# Patient Record
Sex: FEMALE | Race: WHITE | NOT HISPANIC OR LATINO | Employment: UNEMPLOYED | URBAN - METROPOLITAN AREA
[De-identification: names, ages, dates, MRNs, and addresses within clinical notes are randomized per-mention and may not be internally consistent; named-entity substitution may affect disease eponyms.]

---

## 2018-09-24 ENCOUNTER — HOSPITAL ENCOUNTER (OUTPATIENT)
Dept: LAB | Facility: MEDICAL CENTER | Age: 33
End: 2018-09-24
Attending: OBSTETRICS & GYNECOLOGY
Payer: COMMERCIAL

## 2018-09-24 PROCEDURE — 88175 CYTOPATH C/V AUTO FLUID REDO: CPT

## 2018-09-25 LAB — CYTOLOGY REG CYTOL: NORMAL

## 2018-10-11 LAB
ABO GROUP BLD: NORMAL
HBV SURFACE AG SERPL QL IA: NORMAL
RH BLD: NORMAL
RUBV IGG SERPL IA-ACNC: NORMAL

## 2018-12-17 ENCOUNTER — HOSPITAL ENCOUNTER (OUTPATIENT)
Dept: LAB | Facility: MEDICAL CENTER | Age: 33
End: 2018-12-17
Attending: OBSTETRICS & GYNECOLOGY
Payer: COMMERCIAL

## 2018-12-17 LAB
GLUCOSE 1H P 50 G GLC PO SERPL-MCNC: 108 MG/DL (ref 70–139)
HCT VFR BLD AUTO: 34.6 % (ref 37–47)
HGB BLD-MCNC: 11.2 G/DL (ref 12–16)
PLATELET # BLD AUTO: 216 K/UL (ref 164–446)
TREPONEMA PALLIDUM IGG+IGM AB [PRESENCE] IN SERUM OR PLASMA BY IMMUNOASSAY: NON REACTIVE

## 2018-12-17 PROCEDURE — 36415 COLL VENOUS BLD VENIPUNCTURE: CPT

## 2018-12-17 PROCEDURE — 85049 AUTOMATED PLATELET COUNT: CPT

## 2018-12-17 PROCEDURE — 86780 TREPONEMA PALLIDUM: CPT

## 2018-12-17 PROCEDURE — 82950 GLUCOSE TEST: CPT

## 2018-12-17 PROCEDURE — 85014 HEMATOCRIT: CPT

## 2018-12-17 PROCEDURE — 85018 HEMOGLOBIN: CPT

## 2019-01-09 ENCOUNTER — HOSPITAL ENCOUNTER (OUTPATIENT)
Dept: LAB | Facility: MEDICAL CENTER | Age: 34
End: 2019-01-09
Attending: OBSTETRICS & GYNECOLOGY
Payer: COMMERCIAL

## 2019-01-09 LAB — BLD GP AB SCN SERPL QL: NORMAL

## 2019-01-09 PROCEDURE — 36415 COLL VENOUS BLD VENIPUNCTURE: CPT

## 2019-01-09 PROCEDURE — 86850 RBC ANTIBODY SCREEN: CPT

## 2019-01-15 ENCOUNTER — APPOINTMENT (OUTPATIENT)
Dept: OTHER | Facility: IMAGING CENTER | Age: 34
End: 2019-01-15

## 2019-01-22 ENCOUNTER — APPOINTMENT (OUTPATIENT)
Dept: OTHER | Facility: IMAGING CENTER | Age: 34
End: 2019-01-22

## 2019-01-24 ENCOUNTER — HOSPITAL ENCOUNTER (OUTPATIENT)
Facility: MEDICAL CENTER | Age: 34
End: 2019-01-24
Attending: OBSTETRICS & GYNECOLOGY | Admitting: OBSTETRICS & GYNECOLOGY
Payer: COMMERCIAL

## 2019-01-24 VITALS — OXYGEN SATURATION: 95 % | HEART RATE: 92 BPM | DIASTOLIC BLOOD PRESSURE: 67 MMHG | SYSTOLIC BLOOD PRESSURE: 101 MMHG

## 2019-01-24 PROCEDURE — 59025 FETAL NON-STRESS TEST: CPT

## 2019-01-25 NOTE — PROGRESS NOTES
, EDC 19 = 30.0 weeks,     1720- Patient presents to L&D  with C/O SOB throughout the day. Patient denies any contractions, leaking of fluid or any vaginal bleeding. EFM and TOCO applied. States positive fetal movement. VSS. Patient states she noticed she was SOB this am. After resting it resolved, but noticed it has been coming and going throughout the day. Denies any recent colds, but states she had more sinus congestion on the left side today. EFM and TOCO applied. Pulse ox 96% pulse 83. Lungs ausculted and clear throughout. Patient appears to be no distress. States she is feeling better and not SOB at this time.     1815- patient states Jaimie Segovia called and updated on patient status. Order received to discharge home. If patient experience increase in SOB she is to go to ER    1830- Patient given  labor and to continue with kick counts. Patient states understanding of when to return to L&D. Instructed patient that if SOB increases she is to f/u in the ER. Patient discharged home in stable condition.

## 2019-01-29 ENCOUNTER — APPOINTMENT (OUTPATIENT)
Dept: OTHER | Facility: IMAGING CENTER | Age: 34
End: 2019-01-29

## 2019-02-05 ENCOUNTER — APPOINTMENT (OUTPATIENT)
Dept: OTHER | Facility: IMAGING CENTER | Age: 34
End: 2019-02-05

## 2019-02-12 ENCOUNTER — APPOINTMENT (OUTPATIENT)
Dept: OTHER | Facility: IMAGING CENTER | Age: 34
End: 2019-02-12

## 2019-02-19 ENCOUNTER — HOSPITAL ENCOUNTER (OUTPATIENT)
Dept: LAB | Facility: MEDICAL CENTER | Age: 34
End: 2019-02-19
Attending: OBSTETRICS & GYNECOLOGY
Payer: COMMERCIAL

## 2019-02-19 ENCOUNTER — APPOINTMENT (OUTPATIENT)
Dept: OTHER | Facility: IMAGING CENTER | Age: 34
End: 2019-02-19

## 2019-02-19 PROCEDURE — 87081 CULTURE SCREEN ONLY: CPT

## 2019-02-19 PROCEDURE — 87150 DNA/RNA AMPLIFIED PROBE: CPT

## 2019-02-20 LAB — GP B STREP DNA SPEC QL NAA+PROBE: NEGATIVE

## 2019-02-26 ENCOUNTER — APPOINTMENT (OUTPATIENT)
Dept: OTHER | Facility: IMAGING CENTER | Age: 34
End: 2019-02-26

## 2019-03-06 ENCOUNTER — APPOINTMENT (OUTPATIENT)
Dept: OTHER | Facility: IMAGING CENTER | Age: 34
End: 2019-03-06

## 2019-04-06 ENCOUNTER — HOSPITAL ENCOUNTER (INPATIENT)
Facility: MEDICAL CENTER | Age: 34
LOS: 2 days | End: 2019-04-09
Attending: OBSTETRICS & GYNECOLOGY | Admitting: OBSTETRICS & GYNECOLOGY
Payer: COMMERCIAL

## 2019-04-07 ENCOUNTER — ANESTHESIA (OUTPATIENT)
Dept: RADIOLOGY | Facility: MEDICAL CENTER | Age: 34
End: 2019-04-07
Payer: COMMERCIAL

## 2019-04-07 ENCOUNTER — ANESTHESIA EVENT (OUTPATIENT)
Dept: RADIOLOGY | Facility: MEDICAL CENTER | Age: 34
End: 2019-04-07
Payer: COMMERCIAL

## 2019-04-07 ENCOUNTER — APPOINTMENT (OUTPATIENT)
Dept: RADIOLOGY | Facility: MEDICAL CENTER | Age: 34
End: 2019-04-07
Attending: OBSTETRICS & GYNECOLOGY
Payer: COMMERCIAL

## 2019-04-07 ENCOUNTER — ANESTHESIA EVENT (OUTPATIENT)
Dept: OBGYN | Facility: MEDICAL CENTER | Age: 34
End: 2019-04-07
Payer: COMMERCIAL

## 2019-04-07 LAB
BASOPHILS # BLD AUTO: 0.3 % (ref 0–1.8)
BASOPHILS # BLD: 0.04 K/UL (ref 0–0.12)
EOSINOPHIL # BLD AUTO: 0.06 K/UL (ref 0–0.51)
EOSINOPHIL NFR BLD: 0.5 % (ref 0–6.9)
ERYTHROCYTE [DISTWIDTH] IN BLOOD BY AUTOMATED COUNT: 43.2 FL (ref 35.9–50)
ERYTHROCYTE [DISTWIDTH] IN BLOOD BY AUTOMATED COUNT: 43.6 FL (ref 35.9–50)
HCT VFR BLD AUTO: 33.6 % (ref 37–47)
HCT VFR BLD AUTO: 38.2 % (ref 37–47)
HGB BLD-MCNC: 11.6 G/DL (ref 12–16)
HGB BLD-MCNC: 13.5 G/DL (ref 12–16)
HOLDING TUBE BB 8507: NORMAL
IMM GRANULOCYTES # BLD AUTO: 0.13 K/UL (ref 0–0.11)
IMM GRANULOCYTES NFR BLD AUTO: 1.1 % (ref 0–0.9)
LYMPHOCYTES # BLD AUTO: 1.97 K/UL (ref 1–4.8)
LYMPHOCYTES NFR BLD: 16.7 % (ref 22–41)
MCH RBC QN AUTO: 34.1 PG (ref 27–33)
MCH RBC QN AUTO: 34.7 PG (ref 27–33)
MCHC RBC AUTO-ENTMCNC: 34.5 G/DL (ref 33.6–35)
MCHC RBC AUTO-ENTMCNC: 35.3 G/DL (ref 33.6–35)
MCV RBC AUTO: 98.2 FL (ref 81.4–97.8)
MCV RBC AUTO: 98.8 FL (ref 81.4–97.8)
MONOCYTES # BLD AUTO: 0.94 K/UL (ref 0–0.85)
MONOCYTES NFR BLD AUTO: 8 % (ref 0–13.4)
NEUTROPHILS # BLD AUTO: 8.64 K/UL (ref 2–7.15)
NEUTROPHILS NFR BLD: 73.4 % (ref 44–72)
NRBC # BLD AUTO: 0 K/UL
NRBC BLD-RTO: 0 /100 WBC
PLATELET # BLD AUTO: 121 K/UL (ref 164–446)
PLATELET # BLD AUTO: 175 K/UL (ref 164–446)
PMV BLD AUTO: 11 FL (ref 9–12.9)
PMV BLD AUTO: 11.3 FL (ref 9–12.9)
RBC # BLD AUTO: 3.4 M/UL (ref 4.2–5.4)
RBC # BLD AUTO: 3.89 M/UL (ref 4.2–5.4)
WBC # BLD AUTO: 11.8 K/UL (ref 4.8–10.8)
WBC # BLD AUTO: 13.1 K/UL (ref 4.8–10.8)

## 2019-04-07 PROCEDURE — 85025 COMPLETE CBC W/AUTO DIFF WBC: CPT

## 2019-04-07 PROCEDURE — 36415 COLL VENOUS BLD VENIPUNCTURE: CPT

## 2019-04-07 PROCEDURE — 303615 HCHG EPIDURAL/SPINAL ANESTHESIA FOR LABOR

## 2019-04-07 PROCEDURE — 770002 HCHG ROOM/CARE - OB PRIVATE (112)

## 2019-04-07 PROCEDURE — 59409 OBSTETRICAL CARE: CPT

## 2019-04-07 PROCEDURE — 76819 FETAL BIOPHYS PROFIL W/O NST: CPT

## 2019-04-07 PROCEDURE — 0KQM0ZZ REPAIR PERINEUM MUSCLE, OPEN APPROACH: ICD-10-PCS | Performed by: OBSTETRICS & GYNECOLOGY

## 2019-04-07 PROCEDURE — 0UQMXZZ REPAIR VULVA, EXTERNAL APPROACH: ICD-10-PCS | Performed by: OBSTETRICS & GYNECOLOGY

## 2019-04-07 PROCEDURE — 700111 HCHG RX REV CODE 636 W/ 250 OVERRIDE (IP): Performed by: ANESTHESIOLOGY

## 2019-04-07 PROCEDURE — 700111 HCHG RX REV CODE 636 W/ 250 OVERRIDE (IP): Performed by: OBSTETRICS & GYNECOLOGY

## 2019-04-07 PROCEDURE — 700102 HCHG RX REV CODE 250 W/ 637 OVERRIDE(OP): Performed by: OBSTETRICS & GYNECOLOGY

## 2019-04-07 PROCEDURE — 700105 HCHG RX REV CODE 258: Performed by: ANESTHESIOLOGY

## 2019-04-07 PROCEDURE — 700105 HCHG RX REV CODE 258: Performed by: OBSTETRICS & GYNECOLOGY

## 2019-04-07 PROCEDURE — 85027 COMPLETE CBC AUTOMATED: CPT

## 2019-04-07 PROCEDURE — 700105 HCHG RX REV CODE 258

## 2019-04-07 PROCEDURE — 304965 HCHG RECOVERY SERVICES

## 2019-04-07 PROCEDURE — A9270 NON-COVERED ITEM OR SERVICE: HCPCS | Performed by: OBSTETRICS & GYNECOLOGY

## 2019-04-07 RX ORDER — METHYLERGONOVINE MALEATE 0.2 MG/ML
0.2 INJECTION INTRAVENOUS
Status: DISCONTINUED | OUTPATIENT
Start: 2019-04-07 | End: 2019-04-09 | Stop reason: HOSPADM

## 2019-04-07 RX ORDER — CARBOPROST TROMETHAMINE 250 UG/ML
250 INJECTION, SOLUTION INTRAMUSCULAR
Status: DISCONTINUED | OUTPATIENT
Start: 2019-04-07 | End: 2019-04-09 | Stop reason: HOSPADM

## 2019-04-07 RX ORDER — ROPIVACAINE HYDROCHLORIDE 2 MG/ML
INJECTION, SOLUTION EPIDURAL; INFILTRATION; PERINEURAL
Status: ACTIVE
Start: 2019-04-07 | End: 2019-04-07

## 2019-04-07 RX ORDER — DOCUSATE SODIUM 100 MG/1
100 CAPSULE, LIQUID FILLED ORAL 2 TIMES DAILY PRN
Status: DISCONTINUED | OUTPATIENT
Start: 2019-04-07 | End: 2019-04-09 | Stop reason: HOSPADM

## 2019-04-07 RX ORDER — SODIUM CHLORIDE, SODIUM LACTATE, POTASSIUM CHLORIDE, AND CALCIUM CHLORIDE .6; .31; .03; .02 G/100ML; G/100ML; G/100ML; G/100ML
250 INJECTION, SOLUTION INTRAVENOUS PRN
Status: DISCONTINUED | OUTPATIENT
Start: 2019-04-07 | End: 2019-04-07 | Stop reason: HOSPADM

## 2019-04-07 RX ORDER — HYDROCODONE BITARTRATE AND ACETAMINOPHEN 5; 325 MG/1; MG/1
2 TABLET ORAL EVERY 4 HOURS PRN
Status: DISCONTINUED | OUTPATIENT
Start: 2019-04-07 | End: 2019-04-07

## 2019-04-07 RX ORDER — ONDANSETRON 4 MG/1
4 TABLET, ORALLY DISINTEGRATING ORAL EVERY 6 HOURS PRN
Status: DISCONTINUED | OUTPATIENT
Start: 2019-04-07 | End: 2019-04-07

## 2019-04-07 RX ORDER — MISOPROSTOL 200 UG/1
600 TABLET ORAL
Status: DISCONTINUED | OUTPATIENT
Start: 2019-04-07 | End: 2019-04-09 | Stop reason: HOSPADM

## 2019-04-07 RX ORDER — SODIUM CHLORIDE, SODIUM LACTATE, POTASSIUM CHLORIDE, CALCIUM CHLORIDE 600; 310; 30; 20 MG/100ML; MG/100ML; MG/100ML; MG/100ML
INJECTION, SOLUTION INTRAVENOUS
Status: COMPLETED
Start: 2019-04-07 | End: 2019-04-07

## 2019-04-07 RX ORDER — BUPIVACAINE HYDROCHLORIDE 2.5 MG/ML
INJECTION, SOLUTION EPIDURAL; INFILTRATION; INTRACAUDAL
Status: COMPLETED
Start: 2019-04-07 | End: 2019-04-07

## 2019-04-07 RX ORDER — METHYLERGONOVINE MALEATE 0.2 MG/ML
0.2 INJECTION INTRAVENOUS
Status: DISCONTINUED | OUTPATIENT
Start: 2019-04-07 | End: 2019-04-07 | Stop reason: HOSPADM

## 2019-04-07 RX ORDER — ROPIVACAINE HYDROCHLORIDE 2 MG/ML
INJECTION, SOLUTION EPIDURAL; INFILTRATION; PERINEURAL CONTINUOUS
Status: DISCONTINUED | OUTPATIENT
Start: 2019-04-07 | End: 2019-04-07

## 2019-04-07 RX ORDER — DEXTROSE, SODIUM CHLORIDE, SODIUM LACTATE, POTASSIUM CHLORIDE, AND CALCIUM CHLORIDE 5; .6; .31; .03; .02 G/100ML; G/100ML; G/100ML; G/100ML; G/100ML
INJECTION, SOLUTION INTRAVENOUS CONTINUOUS
Status: DISCONTINUED | OUTPATIENT
Start: 2019-04-07 | End: 2019-04-07

## 2019-04-07 RX ORDER — SODIUM CHLORIDE, SODIUM LACTATE, POTASSIUM CHLORIDE, CALCIUM CHLORIDE 600; 310; 30; 20 MG/100ML; MG/100ML; MG/100ML; MG/100ML
INJECTION, SOLUTION INTRAVENOUS CONTINUOUS
Status: DISPENSED | OUTPATIENT
Start: 2019-04-07 | End: 2019-04-07

## 2019-04-07 RX ORDER — SODIUM CHLORIDE, SODIUM LACTATE, POTASSIUM CHLORIDE, AND CALCIUM CHLORIDE .6; .31; .03; .02 G/100ML; G/100ML; G/100ML; G/100ML
1000 INJECTION, SOLUTION INTRAVENOUS
Status: DISCONTINUED | OUTPATIENT
Start: 2019-04-07 | End: 2019-04-07 | Stop reason: HOSPADM

## 2019-04-07 RX ORDER — MISOPROSTOL 200 UG/1
800 TABLET ORAL
Status: COMPLETED | OUTPATIENT
Start: 2019-04-07 | End: 2019-04-07

## 2019-04-07 RX ORDER — HYDROCODONE BITARTRATE AND ACETAMINOPHEN 5; 325 MG/1; MG/1
1 TABLET ORAL EVERY 4 HOURS PRN
Status: DISCONTINUED | OUTPATIENT
Start: 2019-04-07 | End: 2019-04-07

## 2019-04-07 RX ORDER — SODIUM CHLORIDE, SODIUM LACTATE, POTASSIUM CHLORIDE, CALCIUM CHLORIDE 600; 310; 30; 20 MG/100ML; MG/100ML; MG/100ML; MG/100ML
INJECTION, SOLUTION INTRAVENOUS PRN
Status: DISCONTINUED | OUTPATIENT
Start: 2019-04-07 | End: 2019-04-09 | Stop reason: HOSPADM

## 2019-04-07 RX ORDER — ONDANSETRON 2 MG/ML
4 INJECTION INTRAMUSCULAR; INTRAVENOUS EVERY 6 HOURS PRN
Status: DISCONTINUED | OUTPATIENT
Start: 2019-04-07 | End: 2019-04-07

## 2019-04-07 RX ORDER — IBUPROFEN 600 MG/1
600 TABLET ORAL EVERY 6 HOURS PRN
Status: DISCONTINUED | OUTPATIENT
Start: 2019-04-07 | End: 2019-04-07

## 2019-04-07 RX ORDER — BUPIVACAINE HYDROCHLORIDE 2.5 MG/ML
INJECTION, SOLUTION EPIDURAL; INFILTRATION; INTRACAUDAL PRN
Status: DISCONTINUED | OUTPATIENT
Start: 2019-04-07 | End: 2019-04-07 | Stop reason: SURG

## 2019-04-07 RX ADMIN — SODIUM CHLORIDE, POTASSIUM CHLORIDE, SODIUM LACTATE AND CALCIUM CHLORIDE 1000 ML: 600; 310; 30; 20 INJECTION, SOLUTION INTRAVENOUS at 13:42

## 2019-04-07 RX ADMIN — BUPIVACAINE HYDROCHLORIDE 5 ML: 2.5 INJECTION, SOLUTION EPIDURAL; INFILTRATION; INTRACAUDAL; PERINEURAL at 07:15

## 2019-04-07 RX ADMIN — SODIUM CHLORIDE, POTASSIUM CHLORIDE, SODIUM LACTATE AND CALCIUM CHLORIDE 1000 ML: 600; 310; 30; 20 INJECTION, SOLUTION INTRAVENOUS at 07:04

## 2019-04-07 RX ADMIN — SODIUM CHLORIDE, POTASSIUM CHLORIDE, SODIUM LACTATE AND CALCIUM CHLORIDE 1000 ML: 600; 310; 30; 20 INJECTION, SOLUTION INTRAVENOUS at 08:11

## 2019-04-07 RX ADMIN — SODIUM CHLORIDE, SODIUM LACTATE, POTASSIUM CHLORIDE, CALCIUM CHLORIDE AND DEXTROSE MONOHYDRATE 1000 ML: 5; 600; 310; 30; 20 INJECTION, SOLUTION INTRAVENOUS at 11:03

## 2019-04-07 RX ADMIN — MISOPROSTOL 800 MCG: 200 TABLET ORAL at 16:15

## 2019-04-07 RX ADMIN — SODIUM CHLORIDE, POTASSIUM CHLORIDE, SODIUM LACTATE AND CALCIUM CHLORIDE: 600; 310; 30; 20 INJECTION, SOLUTION INTRAVENOUS at 02:00

## 2019-04-07 RX ADMIN — Medication 1000 ML/HR: at 17:43

## 2019-04-07 RX ADMIN — IBUPROFEN 600 MG: 600 TABLET ORAL at 19:46

## 2019-04-07 RX ADMIN — Medication 998 ML/HR: at 16:00

## 2019-04-07 RX ADMIN — FENTANYL CITRATE 100 MCG: 50 INJECTION, SOLUTION INTRAMUSCULAR; INTRAVENOUS at 07:15

## 2019-04-07 RX ADMIN — ROPIVACAINE HYDROCHLORIDE: 2 INJECTION, SOLUTION EPIDURAL; INFILTRATION at 07:18

## 2019-04-07 RX ADMIN — SODIUM CHLORIDE, SODIUM LACTATE, POTASSIUM CHLORIDE, CALCIUM CHLORIDE AND DEXTROSE MONOHYDRATE 1000 ML: 5; 600; 310; 30; 20 INJECTION, SOLUTION INTRAVENOUS at 08:10

## 2019-04-07 ASSESSMENT — COPD QUESTIONNAIRES
IN THE PAST 12 MONTHS DO YOU DO LESS THAN YOU USED TO BECAUSE OF YOUR BREATHING PROBLEMS: DISAGREE/UNSURE
DO YOU EVER COUGH UP ANY MUCUS OR PHLEGM?: NO/ONLY WITH OCCASIONAL COLDS OR INFECTIONS
DURING THE PAST 4 WEEKS HOW MUCH DID YOU FEEL SHORT OF BREATH: NONE/LITTLE OF THE TIME
COPD SCREENING SCORE: 0
HAVE YOU SMOKED AT LEAST 100 CIGARETTES IN YOUR ENTIRE LIFE: NO/DON'T KNOW

## 2019-04-07 ASSESSMENT — LIFESTYLE VARIABLES
ALCOHOL_USE: NO
EVER_SMOKED: NEVER

## 2019-04-07 ASSESSMENT — PATIENT HEALTH QUESTIONNAIRE - PHQ9
2. FEELING DOWN, DEPRESSED, IRRITABLE, OR HOPELESS: NOT AT ALL
1. LITTLE INTEREST OR PLEASURE IN DOING THINGS: NOT AT ALL
SUM OF ALL RESPONSES TO PHQ9 QUESTIONS 1 AND 2: 0

## 2019-04-07 NOTE — CARE PLAN
Problem: Safety  Goal: Free from accidental injury  Outcome: PROGRESSING AS EXPECTED  Patient/Family instructed to call RN with any spills, cords in the way on the floor or any other safety hazards. Patient/Family also instructed to call RN with change to LOC, feelings of dizziness, blurry vision or any change to comfort or concern for safety.   Medication administered as ordered, verified with patient/scanned in to MAR and armband on patient.     Problem: Pain  Goal: Alleviation of Pain or a reduction in pain to the patient's comfort goal  Outcome: PROGRESSING AS EXPECTED  Patient assessed for pain; ongoing. Encouraged to use non pharmaceutical options for pain such at heat pack and positioning and if not able to rest with discomfort then encouraged to request medication for pain.  Patient encouraged to call RN with change to comfort/questions/needs PRN. Plan epidural placement    Problem: Infection  Goal: Will remain free from infection  Outcome: PROGRESSING AS EXPECTED  Ongoing observation and assessment of signs/symptoms of potential infection. Patient/family aware of importance of handwashing and available foam on the wall for use as well.

## 2019-04-07 NOTE — L&D DELIVERY NOTE
LABOR AND DELIVERY    DELIVERY SUMMARY    STAGE I LABOR:    The patient is a 34 yo  at 40w6d based upon her LMP of 18 which is consistent with a 13 week u/s performed on 18 who presented to labor and delivery in active labor.  She is GBS negative.  At the time of admission, her cervix was 6-7/90%/-2.  She underwent AROM of clear fluid at 1100 and at that time, her cervix was 9/90%/-2.  She received an epidural for labor analgesia and progressed into complete at 1230.  Category I-II tracing during stage I labor.    STAGE II LABOR: 3 hours and 26 minutes    I was present for a  of a viable female infant at 1556.  Deep variable decelerations noted during stage II labor.  The vertex of the infant delivered without difficulty.  No nuchal cord was palpated and no shoulder dystocia was encountered.  A nuchal arm was noted.  The infant's mouth and nose were bulb suctioned at the perineum secondary to meconium stained fluid.  RT was present for delivery.  The remainder of the infant delivered without difficulty.  The infant was placed on the mother's abdomen.  The infant's mouth and nose were bulb suctioned.  Delayed cord clamping was performed   The cord was clamped and cut.  A segment of cord was clamped and cut for a cord gas and these results are noted below.  The APGAR scores were 8 at one minute and 9 at five minutes.  The infant weight is pending.    The patient received IV pitocin after delivery of the infant.    Results for HELDER POND GIRL (MRN [id number]) as of 2019 16:27   Ref. Range 2019 16:00   Cord Bg Ph Unknown 7.15   Cord Bg Pco2 Latest Units: mmHg 57.0   Cord Bg Po2 Latest Units: mmHg 20.1   Cord Bg Hco3 Latest Units: mmol/L 20   Cord Bg Base Excess Latest Units: mmol/L -10   Cord Bg O2 Saturation Latest Units: % 35.6   CV Ph Unknown 7.28   CV Pco2 Latest Units: mmHg 40.9   CV Po2 Unknown 22.4   CV Hco3 Latest Units: mmol/L 19   CV Base Excess Latest Units: mmol/L -7   CV O2  Saturation Latest Units: % 47.0       STAGE III LABOR: 18 MINUTES    The placenta delivered intact with a 3-vessel cord at 1614.    The patient's perineum was examined and found to have sustained a 2nd degree perineal laceration and bilateral labia minora lacerations.  These lacerations were repaired in the usual fashion with 3-0 vicryl.    Bimanual uterine massage and exploration were performed and small clots were removed from the lower uterine segment.    The patient received 800 mcg of cytotec per rectum x 1 after the above examination.    EBL - 300 cc

## 2019-04-07 NOTE — PROGRESS NOTES
"0700 - Report received from Jessika MOSHER RN at , care assumed. [facility] Gestation - 40.3 Weeks      Patient is in bed awake - working through contractions with excellent control.   FOB \"Mario\" at  - patient is not expecting more visitors today. Patient would like the FOB at  during the pushing and delivery phase.    Patient would like immediate skin/skin at delivery.   Once the cord has stopped pulsating, FOB would like to cut the cord.   Breastfeeding with DBM if necessary    Reports little sleep last night, Denies contractions off/on since Thursday; x3 days. Denies ill feeling, reports FM, denies ROM or Bleeding, No change to vision/edema/HA; states she has been getting up to the BR herself without assist, denies dizziness or weakness.     Use of FM/[place] discussed and in place, discussed POC, sleepy affect/mood. Review of labor process/expectations, breathing/relaxation techniquesTBD as needed; currently method in use successful. IV bolus in progress for epidural. Call to anesthesia- Dr. Clayton requesting epidural. Patient has read and signed the epidural consent.     Patient/FOB deny questions/concerns regarding care since arrival to [facility].   RN contact information updated on the dry erase board; reviewed.   Patient encouraged to call RN with all questions/concerns/needs.    0730 - Epidural placed with immediate relief reported.     1556 -  of female at 1556 by Dr. Thomas. Leslie THORNE RN at  to care for NB. FOB at . NB placed to mothers abdomen over towel, FOB c/c as requested once pulsating stopped. Food ordered from [employer]. Water and sprite at BS.     1700 - Patient unable to report lower extremities as normal - reports they are both numb with some movement of left foot.   Denies needs at this time. Nb at breast.      1800 - Patient reports feeling more sensation on her legs from the knees to feet, heavy with bending. Food from [employer] at . Denies need. Patients family now at . " Plan to reassess at 1900.     1900 - Report to Jasmin RUEDA RN

## 2019-04-07 NOTE — H&P
DATE OF ADMISSION:  2019    ADMISSION DIAGNOSES:  1.  Intrauterine pregnancy at 40+6 weeks gestation.  2.  Active labor.  3.  Group B streptococcus negative.  4.  Reassuring  testing with a BPP of 6/8.    HISTORY OF PRESENT ILLNESS:  The patient is a 33-year-old  1, para 0 at   40+6 weeks' gestation based upon her LMP of 2018, which is consistent   with a 13-week ultrasound performed on 2018.  The patient presented to   labor and delivery with a report of regular and painful uterine contractions.    At the time of her presentation to labor and delivery and to triage, her   cervix was 2 cm, 80% effaced, -2 station.  The fetal heart rate was 140s with   minimal variability.  She underwent the BPP, which was 6/8.  Her cervix was   reexamined at 01:20 a.m. and she was 6-7 cm, 90% effaced, -2 station, intact.    The patient reports positive fetal movement.  She denies vaginal bleeding or   leakage of fluid.    Prenatal care with Dr. Elisha Puri, first visit on 2018 at 30 weeks   gestation.  Third trimester blood pressures /62-70.    PRENATAL LABS:  GBS negative, blood type O positive, antibody screen negative,   rubella immune, RPR nonreactive, hepatitis B surface antigen negative, hep C   viral antibody negative, HIV negative, GC chlamydia negative-negative.  One   hour .  Maternity 21 testing negative for trisomies 21, 18 and 13.  XX   female fetus.    OBSTETRIC ULTRASOUND:  1.  On 2018 at 13 weeks' gestation, [place] 2019.  2.  On 2018, at 19+5 weeks gestation, [place] 2019.  Normal anatomy,   posterior placenta, no previa, female fetus.    OBSTETRIC HISTORY:  Primigravida.    ALLERGIES:  No known drug allergies.    MEDICATIONS:  Prenatal vitamins.    SOCIAL HISTORY:  She is .  Her 's name is Mario.  She denies   alcohol, tobacco, or drugs of abuse.    FAMILY HISTORY:  Noncontributory.    PAST SURGICAL HISTORY:  [place] tooth  extraction.    ISSUES THIS PREGNANCY:  AMA and anemia on iron supplementation.    PHYSICAL EXAMINATION:  VITAL SIGNS:  Blood pressures 142/76, pulse 83, temperature 98, respiratory   rate 15.  GENERAL:  Alert, awake and oriented x3, in no acute distress after her   epidural.  PELVIC:  Sterile vaginal exam at 11:00 a.m., her cervix is 9 cm, 90% effaced,   -1 station.  Artificial rupture of membranes of clear fluid performed.  [place],   she is jan every 3-6 minutes.  External fetal monitoring, fetal heart   tones in the 130s with accels to the 150s.  No decelerations noted.  There is   minimal variability noted.  Category 1-2 tracing, vertex presentation.    LABORATORY DATA:  Labs on admission, white count 11.8, hemoglobin 13.5,   hematocrit 38.2, and platelets 175.    ASSESSMENT AND PLAN:  A 33-year-old  1, para 0 at 40+6 weeks' gestation   based upon her LMP, which is consistent with a 13-week ultrasound who   presents to labor and delivery in active labor who received an epidural for   labor analgesia who is GBS negative.  The patient underwent artificial rupture   of membranes of clear fluid.  She has an epidural for labor analgesia, is GBS   negative and will anticipate a normal spontaneous vaginal delivery.       ____________________________________     MD LEXII CAMPOS / NTS    DD:  2019 11:15:21  DT:  2019 11:53:57    D#:  [id number]  Job#:  [id number]

## 2019-04-07 NOTE — ANESTHESIA PROCEDURE NOTES
Epidural Block  Performed by: JAKE MOREL  Authorized by: JAKE MOREL     Patient Location:  OB  Start Time:  4/7/2019 7:15 AM  Reason for Block: labor analgesia    patient identified, IV checked, site marked, risks and benefits discussed, surgical consent, monitors and equipment checked, pre-op evaluation and timeout performed    Patient Position:  Sitting  Prep: ChloraPrep, patient draped and sterile technique    Monitoring:  Blood pressure, continuous pulse oximetry and heart rate  Approach:  Midline  Location:  L3-L4  Injection Technique:  CJ air  Skin infiltration:  Lidocaine  Strength:  1%  Dose:  3ml  Needle Type:  Tuohy  Needle Gauge:  17 G  Needle Length:  3.5 in  Loss of resistance::  5  Catheter Size:  19 G  Catheter at Skin Depth:  9  Test Dose:  Lidocaine 1.5% with epinephrine 1-to-200,000  Test Dose Result:  Negative   Hx/exam/labs/vitals noted.  Questions answered, pt consented.  Timeout/Checklist performed.  All appropriate monitors placed w/ pt in the sitting position.  Sterile technique w/ skin infiltration as noted above.  Catheter placed with ease.  Negative CSF/Heme/Paresthesia. Negative TD.  No evidence of complications.

## 2019-04-07 NOTE — ANESTHESIA PREPROCEDURE EVALUATION
[facility] IUP @ 40.3 wks    Relevant Problems   No relevant active problems       Physical Exam    Airway   Mallampati: II  TM distance: >3 FB  Neck ROM: full       Cardiovascular - normal exam  Rhythm: regular  Rate: normal  (-) murmur     Dental - normal exam         Pulmonary - normal exam  Breath sounds clear to auscultation     Abdominal    Neurological - normal exam                 Anesthesia Plan    ASA 2       Plan - epidural   Neuraxial block will be labor analgesia      Plan Factors:   Patient was not previously instructed to abstain from smoking on day of procedure.  Patient did not smoke on day of procedure.          Pertinent diagnostic labs and testing reviewed    Informed Consent:    Anesthetic plan and risks discussed with patient.

## 2019-04-07 NOTE — PROGRESS NOTES
CAROLINA 19  GA 40w3d     0140- Pt to labor unit for admission. TOCO/EFM applied. IV started labs drawn.   0400- Pt reports feeling increase pressure during UCs. SVE 7/100/-1. Pt pain management discussed again, pt denies intervention now. Will cont to assess   0640- Pt feeling pressure. SVE 7/100/0. Bloody show noted. Pt requesting epidural. Bolus started.

## 2019-04-07 NOTE — H&P
"LABOR AND DELIVERY ADMISSION H AND P    ADMISSION DIAGNOSIS:    1.  IUP at 40w6d.  2.  Active labor.  3.  GBS negative.  4.  Desires epidural for labor analgesia.    PLAN:    1.  Admit to L and D.  2.  Epidural.  3.  AROM for labor augmentation.  4.  Anticipate .    Recent Labs      19   0200   WBC  11.8*   RBC  3.89*   HEMOGLOBIN  13.5   HEMATOCRIT  38.2   MCV  98.2*   MCH  34.7*   RDW  43.2   PLATELETCT  175   MPV  11.3   NEUTSPOLYS  73.40*   LYMPHOCYTES  16.70*   MONOCYTES  8.00   EOSINOPHILS  0.50   BASOPHILS  0.30     /76   Pulse 83   Temp 36.7 °C (98 °F) (Temporal)   Resp 15   Ht 1.676 m (5' 6\")   Wt 68 kg (150 lb)   SpO2 97%     SVE: 9/90%/-1    AROM: clear fluid at 1100    TOCO: every 3-6 minutes    EFM: 140 bpm with accelerations to 150 bpm.  No decelerations.      VERTEX  "

## 2019-04-07 NOTE — ANESTHESIA PREPROCEDURE EVALUATION
Relevant Problems   Within Normal Limits   ANESTHESIA   OB       Physical Exam    Airway   Mallampati: II  TM distance: >3 FB  Neck ROM: full       Cardiovascular - normal exam  Rhythm: regular  Rate: normal  (-) murmur     Dental - normal exam         Pulmonary - normal exam  Breath sounds clear to auscultation     Abdominal     Comments: Gravid     Neurological - normal exam                 Anesthesia Plan    ASA 2       Plan - epidural   Neuraxial block will be labor analgesia      Plan Factors:   Patient did not smoke on day of procedure.          Pertinent diagnostic labs and testing reviewed    Informed Consent:    Anesthetic plan and risks discussed with patient.       best IUP at 40.3 WKS

## 2019-04-07 NOTE — PROGRESS NOTES
2317- Pt presented to labor unit with UC's x 1 hour, denies VB, LOF. Abd soft and non tender to touch. EFM and TOCO applied. Pt confirms fetal movement. SVE 2/80/-2. POC discussed with pt. Pt verbalized understanding.     2320- Pt turned to left side. PO fluids given with crackers and peanut butter.  Minimal variability noted to fetal heart monitor.     0000- Pt given juice x2.     0017- Dr. Thomas notified of pt status and fetal heart tracing. STAT  BPP ordered. US called, spoke to Malaika, en route to triage.  POC discussed with pt. Pt verbalized understanding.     0037- US at bedside.     0120- EFM and TOCO reapplied. SVE 6-7/90/-2.  Dr. Thomas notified. Pt to be admitted to labor unit.     0140- SBAR endorsed to NOAH Conner RN. Pt transferred via wheelchair to labor unit.

## 2019-04-08 PROCEDURE — A9270 NON-COVERED ITEM OR SERVICE: HCPCS | Performed by: OBSTETRICS & GYNECOLOGY

## 2019-04-08 PROCEDURE — 770002 HCHG ROOM/CARE - OB PRIVATE (112)

## 2019-04-08 PROCEDURE — 700102 HCHG RX REV CODE 250 W/ 637 OVERRIDE(OP): Performed by: OBSTETRICS & GYNECOLOGY

## 2019-04-08 PROCEDURE — 700112 HCHG RX REV CODE 229: Performed by: OBSTETRICS & GYNECOLOGY

## 2019-04-08 RX ORDER — IBUPROFEN 600 MG/1
600 TABLET ORAL EVERY 6 HOURS PRN
Status: DISCONTINUED | OUTPATIENT
Start: 2019-04-08 | End: 2019-04-09 | Stop reason: HOSPADM

## 2019-04-08 RX ORDER — HYDROCODONE BITARTRATE AND ACETAMINOPHEN 5; 325 MG/1; MG/1
1 TABLET ORAL EVERY 4 HOURS PRN
Status: DISCONTINUED | OUTPATIENT
Start: 2019-04-08 | End: 2019-04-09 | Stop reason: HOSPADM

## 2019-04-08 RX ORDER — ONDANSETRON 2 MG/ML
4 INJECTION INTRAMUSCULAR; INTRAVENOUS EVERY 6 HOURS PRN
Status: DISCONTINUED | OUTPATIENT
Start: 2019-04-08 | End: 2019-04-09 | Stop reason: HOSPADM

## 2019-04-08 RX ORDER — ONDANSETRON 4 MG/1
4 TABLET, ORALLY DISINTEGRATING ORAL EVERY 6 HOURS PRN
Status: DISCONTINUED | OUTPATIENT
Start: 2019-04-08 | End: 2019-04-09 | Stop reason: HOSPADM

## 2019-04-08 RX ORDER — GUAIFENESIN 600 MG/1
600 TABLET, EXTENDED RELEASE ORAL 2 TIMES DAILY
Status: DISCONTINUED | OUTPATIENT
Start: 2019-04-08 | End: 2019-04-09 | Stop reason: HOSPADM

## 2019-04-08 RX ORDER — HYDROCODONE BITARTRATE AND ACETAMINOPHEN 5; 325 MG/1; MG/1
2 TABLET ORAL EVERY 4 HOURS PRN
Status: DISCONTINUED | OUTPATIENT
Start: 2019-04-08 | End: 2019-04-09 | Stop reason: HOSPADM

## 2019-04-08 RX ADMIN — GUAIFENESIN 600 MG: 600 TABLET, EXTENDED RELEASE ORAL at 16:58

## 2019-04-08 RX ADMIN — IBUPROFEN 600 MG: 600 TABLET ORAL at 16:40

## 2019-04-08 RX ADMIN — IBUPROFEN 600 MG: 600 TABLET ORAL at 09:29

## 2019-04-08 RX ADMIN — DOCUSATE SODIUM 100 MG: 100 CAPSULE, LIQUID FILLED ORAL at 09:29

## 2019-04-08 RX ADMIN — IBUPROFEN 600 MG: 600 TABLET ORAL at 22:38

## 2019-04-08 RX ADMIN — IBUPROFEN 600 MG: 600 TABLET ORAL at 02:41

## 2019-04-08 NOTE — ANESTHESIA QCDR
2019 [facility] Clinical Data Registry (for Quality Improvement)     Postoperative nausea/vomiting risk protocol (Adult = 18 yrs and Pediatric 3-17 yrs)- (430 and 463)  General inhalation anesthetic (NOT TIVA) with PONV risk factors: No  Provision of anti-emetic therapy with at least 2 different classes of agents: N/A  Patient DID NOT receive anti-emetic therapy and reason is documented in Medical Record: N/A    Multimodal Pain Management- (AQI59)  Patient undergoing Elective Surgery (i.e. Outpatient, or ASC, or Prescheduled Surgery prior to Hospital Admission): No  Use of Multimodal Pain Management, two or more drugs and/or interventions, NOT including systemic opioids: N/A  Exception: Documented allergy to multiple classes of analgesics: N/A    PACU assessment of acute postoperative pain prior to Anesthesia Care End- Applies to Patients Age = 18- (ABG7)  Initial PACU pain score is which of the following: < 7/10  Patient unable to report pain score: N/A    Post-anesthetic transfer of care checklist/protocol to PACU/ICU- (426 and 427)  Upon conclusion of case, patient transferred to which of the following locations: PACU/Non-ICU  Use of transfer checklist/protocol: Yes  Exclusion: Service Performed in Patient Hospital Room (and thus did not require transfer): N/A    PACU Reintubation- (AQI31)  General anesthesia requiring endotracheal intubation (ETT) along with subsequent extubation in OR or PACU: No  Required reintubation in the PACU: N/A  Extubation was a planned trial documented in the medical record prior to removal of the original airway device: N/A    Unplanned admission to ICU related to anesthesia service up through end of PACU care- (MD51)  Unplanned admission to ICU (not initially anticipated at anesthesia start time): No

## 2019-04-08 NOTE — PROGRESS NOTES
"PPD #1 s/p  with repair of 2nd degree perineal laceration.    S: Doing well.  Having perineal pain.  Having difficulty ambulating secondary to the perineal pain.  Moderate lochia.  Breast feeding.  Desires discharge tomorrow.  Discussed the use of radha bottle, Tucks pads, and Dermoplast spray.  She does not have Dermoplast spray or Tucks - informed her RN.    O: /73   Pulse 61   Temp 36.2 °C (97.2 °F) (Temporal)   Resp 18   Ht 1.676 m (5' 6\")   Wt 68 kg (150 lb)   SpO2 96%     A, A, and O x 3 NAD    FF u/1    No c/c/e    Recent Labs      19   0200  19   2148   WBC  11.8*  13.1*   RBC  3.89*  3.40*   HEMOGLOBIN  13.5  11.6*   HEMATOCRIT  38.2  33.6*   MCV  98.2*  98.8*   MCH  34.7*  34.1*   RDW  43.2  43.6   PLATELETCT  175  121*   MPV  11.3  11.0   NEUTSPOLYS  73.40*   --    LYMPHOCYTES  16.70*   --    MONOCYTES  8.00   --    EOSINOPHILS  0.50   --    BASOPHILS  0.30   --      A/P: PPD #1 s/p  with repair of 2nd degree perineal laceration.    1.  Continue cares.  2.  Ambulate TID.  3.  Continue pain medications for perineal pain and uterine cramping.  4.  Continue breast feeding.  5.  Discharge to home tomorrow.    "

## 2019-04-08 NOTE — ANESTHESIA POSTPROCEDURE EVALUATION
Patient: Jihan Henderson    Procedure Summary     Date:  04/07/19 Room / Location:      Anesthesia Start:  0709 Anesthesia Stop:  1556    Procedure:  Labor Epidural Diagnosis:      Scheduled Providers:   Responsible Provider:  Gautam Clayton M.D.    Anesthesia Type:  epidural ASA Status:  2          Final Anesthesia Type: epidural  Last vitals  BP   Blood Pressure: 123/73    Temp   36.6 °C (97.9 °F)    Pulse   Pulse: 76   Resp   18    SpO2   97 %      Anesthesia Post Evaluation    Patient location during evaluation: PACU  Patient participation: complete - patient participated  Level of consciousness: awake and alert    Airway patency: patent  Anesthetic complications: no  Cardiovascular status: hemodynamically stable  Respiratory status: acceptable  Hydration status: euvolemic    PONV: none    patient able to participate, but full recovery from regional anesthesia has not occurred and is not expected within the stipulated timeframe for the completion of the evaluation

## 2019-04-08 NOTE — PROGRESS NOTES
Late entry: Summary of events:    1930- Pt up to BR, voided radha care done. Assisted to w/c, transferred to PP. Report to CHARAN Tripathi RN & MATI Royal RN

## 2019-04-08 NOTE — L&D DELIVERY NOTE
DATE OF SERVICE:  2019    Stage I labor:  The patient was admitted as a 33-year-old  1, para 0 at   40+6 weeks' gestation based upon her sure LMP of 2018, which is   consistent with a 13-week ultrasound performed on 2018, who presented to   labor and delivery in active labor.  At the time of admission, her cervix was   2 cm dilated, 80% effaced, -2 station, and progressed to 6-7 cm, 90% effaced,   -2 station.  She was noted to be GBS negative.  The patient received an   epidural for labor analgesia.  She underwent artificial rupture of membranes   of clear fluid at 11:00 a.m. and at that time, her cervix was 9 cm, 90%   effaced, -2 station.  The patient progressed to complete at 12:30 p.m.    Category 1-2 tracing during stage I labor.  There were periods of minimal   variability.    Stage II labor:  Three hours 26 minutes.    I was present for a normal spontaneous vaginal delivery of a vigorous and   viable female infant at 1556 hours on 2019.  Deep variable decelerations   were noted during stage II labor.  The vertex of the infant delivered without   difficulty.  No nuchal cord was palpated.  Of note, during stage II labor,   there was meconium-stained fluid.  There was no shoulder dystocia encountered.    A nuchal arm was noted.  The infant's mouth and nose were bulb suctioned at   the perineum secondary to meconium-stained fluid.  RT was present for   delivery.    The remainder of the infant was delivered without difficulty.  The infant was   placed on the mother's abdomen.  The infant's mouth and nose were bulb   suctioned by the nurse.  Delayed cord clamping was performed.  The cord was   cut after it stopped pulsating.  A segment of cord was clamped and cut and   sent for cord gas and these results are as follows:  Arterial pH 7.15, base   excess -10, venous pH 7.28, base excess -7.    The patient received IV Pitocin after delivery of the infant.  The infant   weight is  pending.    Stage III labor:  Eighteen minutes.  The placenta delivered intact with a   3-vessel cord at 1614 hours.  The patient's perineum was examined and she was   found to have sustained a second-degree perineal laceration and bilateral   labia minora lacerations.  These lacerations were repaired in the usual   fashion with 3-0 Vicryl.    Bimanual uterine massage and exploration were performed and small clots   removed from the lower uterine segment.  The patient received 1800 mcg of   Cytotec per rectum x1 after the above examination.  Estimated blood loss was   300 mL.       ____________________________________     JANY GILLIAM MD    HTA / NTS    DD:  04/07/2019 16:31:42  DT:  04/07/2019 17:07:35    D#:  [id number]  Job#:  [id number]    cc: MARIA ELENA LOTT MD

## 2019-04-08 NOTE — PROGRESS NOTES
Rec'd pt from L&D and report from Mckenna. Patient stable, no signs of distress. MOB and FOB present. Reviewed plan of care. Answered questions, call light within reach, bed locked and low, patient verbalized understanding.

## 2019-04-08 NOTE — CARE PLAN
Problem: Altered physiologic condition related to immediate post-delivery state and potential for bleeding/hemorrhage  Goal: Patient physiologically stable as evidenced by normal lochia, palpable uterine involution and vital signs within normal limits  Outcome: PROGRESSING AS EXPECTED  Pt stable, fundus is firm, lochia light, and vitals signs WNL. Will continue to monitor.     Problem: Alteration in comfort related to episiotomy, vaginal repair and/or after birth pains  Goal: Patient verbalizes acceptable pain level  Outcome: PROGRESSING AS EXPECTED  Pt verbalizes pain effectively, no needs at this time. Will continue to monitor.

## 2019-04-08 NOTE — ANESTHESIA TIME REPORT
Anesthesia Start and Stop Event Times     Date Time Event    4/7/2019 0709 Anesthesia Start     1556 Anesthesia Stop        Responsible Staff  04/07/19    Name Role Begin End    Gautam Clayton M.D. Anesth 0709 1556        Preop Diagnosis (Free Text):  Pre-op Diagnosis             Preop Diagnosis (Codes):    Post op Diagnosis  Pain during labor  Intrauterine pregnancy    Premium Reason  E. Weekend    Comments: Continuous Labor Epidural

## 2019-04-09 VITALS
WEIGHT: 150 LBS | DIASTOLIC BLOOD PRESSURE: 79 MMHG | SYSTOLIC BLOOD PRESSURE: 122 MMHG | HEART RATE: 79 BPM | BODY MASS INDEX: 24.11 KG/M2 | RESPIRATION RATE: 18 BRPM | TEMPERATURE: 98.3 F | OXYGEN SATURATION: 92 % | HEIGHT: 66 IN

## 2019-04-09 PROCEDURE — A9270 NON-COVERED ITEM OR SERVICE: HCPCS | Performed by: OBSTETRICS & GYNECOLOGY

## 2019-04-09 PROCEDURE — 700102 HCHG RX REV CODE 250 W/ 637 OVERRIDE(OP): Performed by: OBSTETRICS & GYNECOLOGY

## 2019-04-09 PROCEDURE — 700112 HCHG RX REV CODE 229: Performed by: OBSTETRICS & GYNECOLOGY

## 2019-04-09 RX ADMIN — DOCUSATE SODIUM 100 MG: 100 CAPSULE, LIQUID FILLED ORAL at 05:54

## 2019-04-09 RX ADMIN — GUAIFENESIN 600 MG: 600 TABLET, EXTENDED RELEASE ORAL at 05:51

## 2019-04-09 RX ADMIN — IBUPROFEN 600 MG: 600 TABLET ORAL at 05:51

## 2019-04-09 ASSESSMENT — EDINBURGH POSTNATAL DEPRESSION SCALE (EPDS)
I HAVE BEEN ABLE TO LAUGH AND SEE THE FUNNY SIDE OF THINGS: AS MUCH AS I ALWAYS COULD
I HAVE BEEN ANXIOUS OR WORRIED FOR NO GOOD REASON: YES, SOMETIMES
I HAVE LOOKED FORWARD WITH ENJOYMENT TO THINGS: AS MUCH AS I EVER DID
THINGS HAVE BEEN GETTING ON TOP OF ME: NO, MOST OF THE TIME I HAVE COPED QUITE WELL
THE THOUGHT OF HARMING MYSELF HAS OCCURRED TO ME: NEVER
I HAVE BEEN SO UNHAPPY THAT I HAVE HAD DIFFICULTY SLEEPING: NOT VERY OFTEN
I HAVE FELT SCARED OR PANICKY FOR NO GOOD REASON: NO, NOT MUCH
I HAVE FELT SAD OR MISERABLE: NOT VERY OFTEN
I HAVE BLAMED MYSELF UNNECESSARILY WHEN THINGS WENT WRONG: NOT VERY OFTEN
I HAVE BEEN SO UNHAPPY THAT I HAVE BEEN CRYING: NO, NEVER

## 2019-04-09 NOTE — CARE PLAN
Problem: Communication  Goal: The ability to communicate needs accurately and effectively will improve  Outcome: PROGRESSING AS EXPECTED  Patient c/o of [facility], Dr. Puri called and she ordered mucinex. Order placed per Md request. Pt. Has been ambulating, taking adequate PO fluids. All questions and concerns answered.     Problem: Pain Management  Goal: Pain level will decrease to patient's comfort goal  Outcome: PROGRESSING AS EXPECTED  Patient likes to keep PRN pain meds as scheduled.

## 2019-04-09 NOTE — LACTATION NOTE
Follow up visit. MOB states nipple is misshapen, and says she is getting sore after infant comes off breast. At this time, infant too sleepy to observe latch.     Encouraged MOB to call for next feeding so we can evaluate latch.

## 2019-04-09 NOTE — CARE PLAN
Problem: Altered physiologic condition related to immediate post-delivery state and potential for bleeding/hemorrhage  Goal: Patient physiologically stable as evidenced by normal lochia, palpable uterine involution and vital signs within normal limits  Outcome: PROGRESSING AS EXPECTED  Fundus firm, lochia scant. Vital signs stable.     Problem: Alteration in comfort related to episiotomy, vaginal repair and/or after birth pains  Goal: Patient verbalizes acceptable pain level  Outcome: PROGRESSING AS EXPECTED  Patient would like Motrin brought in at 0600, unless she calls earlier to request it.

## 2019-04-09 NOTE — LACTATION NOTE
This note was copied from a baby's chart.  MOB holding infant. Has a very supportiven family in re to breastfeeding. Infant  in the first hour as she was allowed skin to skin. Encourage to delay bath to reinforce a good latch. MOB is offering the breast with skin to skin every 2-3 hours and reports infant is latch well. New beginnings booklet given with lactation education to include the benefits of skin to skin, recommendation for delayed pacifier use until breastfeeding well established, and normal feeding patterns over the next few days to weeks with a minimum of 8x/24 hours and cluster feeding as a normal behavior.  MOB voices understanding.    Encourage attendance to the Breastfeeding Circles with times, days, and locations given. Offer info on [facility] for 1:1 consultations by appointment as needed.

## 2019-04-09 NOTE — DISCHARGE SUMMARY
Discharge Summary:      Jihan Henderson      Admit Date:   2019  Discharge Date:  2019     Admitting diagnosis:  Pregnancy  Labor and delivery, indication for care  Discharge Diagnosis: Status post vaginal, spontaneous.  Pregnancy Complications: none  Tubal Ligation:  no        History:  Past Medical History:   Diagnosis Date   • Anxiety    • Depression    • Insomnia      OB History    Para Term  AB Living   1             SAB TAB Ectopic Molar Multiple Live Births                    # Outcome Date GA Lbr Mitesh/2nd Weight Sex Delivery Anes PTL Lv   1 Current                    Patient has no known allergies.  Patient Active Problem List    Diagnosis Date Noted   • Vertigo 10/01/2015   • BMI less than 19,adult 2015        Hospital Course:   33 y.o. , now para 1, was admitted with the above mentioned diagnosis, underwent Active Labor, vaginal, spontaneous. Patient postpartum course was unremarkable, with progressive advancement in diet , ambulation and toleration of oral analgesia. Patient without complaints today and desires discharge.      Vitals:    19 0200 19 0600 19 1800 19 0600   BP: 124/74 119/73 122/77 122/79   Pulse: 62 61 76 79   Resp: 18 18 16 18   Temp: 36.1 °C (97 °F) 36.2 °C (97.2 °F) 36.2 °C (97.1 °F) 36.8 °C (98.3 °F)   TempSrc: Temporal Temporal Temporal Temporal   SpO2: 97% 96% 93% 92%   Weight:       Height:           Current Facility-Administered Medications   Medication Dose   • ibuprofen (MOTRIN) tablet 600 mg  600 mg   • HYDROcodone-acetaminophen (NORCO) 5-325 MG per tablet 1 Tab  1 Tab   • HYDROcodone-acetaminophen (NORCO) 5-325 MG per tablet 2 Tab  2 Tab   • ondansetron (ZOFRAN ODT) dispertab 4 mg  4 mg    Or   • ondansetron (ZOFRAN) syringe/vial injection 4 mg  4 mg   • guaiFENesin LA (MUCINEX) tablet 600 mg  600 mg   • oxytocin (PITOCIN) infusion (for postpartum)   mL/hr   • LR infusion     • PRN oxytocin (PITOCIN) (20  Units/1000 mL) PRN for excessive uterine bleeding - See Admin Instr  125-999 mL/hr   • miSOPROStol (CYTOTEC) tablet 600 mcg  600 mcg   • methylergonovine (METHERGINE) injection 0.2 mg  0.2 mg   • carboPROST (HEMABATE) injection 250 mcg  250 mcg   • docusate sodium (COLACE) capsule 100 mg  100 mg       Exam:  Breast Exam: negative  Abdomen: Abdomen soft, non-tender. BS normal. No masses,  No organomegaly  Fundus Non Tender: no  Extremity: extremities, peripheral pulses and reflexes normal     Labs:  Recent Labs      04/07/19   0200  04/07/19   2148   WBC  11.8*  13.1*   RBC  3.89*  3.40*   HEMOGLOBIN  13.5  11.6*   HEMATOCRIT  38.2  33.6*   MCV  98.2*  98.8*   MCH  34.7*  34.1*   MCHC  35.3*  34.5   RDW  43.2  43.6   PLATELETCT  175  121*   MPV  11.3  11.0        Activity:   Discharge to home  Pelvic Rest x 6 weeks    Assessment:  normal postpartum course  Discharge Assessment: No areas of skin breakdown/redness; surgical incision intact/healing     Follow up: 6 weeks with Dr Puri     Discharge Meds:   No current outpatient prescriptions on file.   OTC ibuprofen    Corazon Segovia M.D.

## 2019-04-09 NOTE — CARE PLAN
Problem: Infection  Goal: Will remain free from infection  Outcome: PROGRESSING AS EXPECTED  Pt afebrile. No s/s infection.    Problem: Venous Thromboembolism (VTW)/Deep Vein Thrombosis (DVT) Prevention:  Goal: Patient will participate in Venous Thrombosis (VTE)/Deep Vein Thrombosis (DVT)Prevention Measures  Outcome: PROGRESSING AS EXPECTED  Pt ambulates within room and no s/s DVT.

## 2019-04-09 NOTE — DISCHARGE INSTRUCTIONS
POSTPARTUM DISCHARGE INSTRUCTIONS FOR MOM    YOB: 1985   Age: 33 y.o.               Admit Date: 2019     Discharge Date: 2019  Attending Doctor:  Elisha Puri M.D.                  Allergies:  Patient has no known allergies.    Discharged to home by car. Discharged via wheelchair, hospital escort: Yes.  Special equipment needed: Not Applicable  Belongings with: Personal  Be sure to schedule a follow-up appointment with your primary care doctor or any specialists as instructed.   Follow up with Dr. Puri in 6 weeks, call and make an appt.    Discharge Plan:   Diet Plan: Discussed  Activity Level: Discussed  Confirmed Follow up Appointment: Patient to Call and Schedule Appointment  Confirmed Symptoms Management: Discussed  Medication Reconciliation Updated: Yes  Influenza Vaccine Indication: Not indicated: Previously immunized this influenza season and > 8 years of age    REASONS TO CALL YOUR OBSTETRICIAN:  1.   Persistent fever or shaking chills (Temperature higher than 100.4)  2.   Heavy bleeding (soaking more than 1 pad per hour); Passing clots  3.   Foul odor from vagina  4.   Mastitis (Breast infection; breast pain, chills, fever, redness)  5.   Urinary pain, burning or frequency  6.   Episiotomy infection  7.   Abdominal incision infection  8.   Severe depression longer than 24 hours    HAND WASHING  · Prior to handling the baby.  · Before breastfeeding or bottle feeding baby.  · After using the bathroom or changing the baby's diaper.    WOUND CARE  Ask your physician for additional care instructions.  In general:    ·  Incision:      · Keep clean and dry.    · Do NOT lift anything heavier than your baby for up to 6 weeks.    · There should not be any opening or pus.      VAGINAL CARE  · Nothing inside vagina for 6 weeks: no sexual intercourse, tampons or douching.  · Bleeding may continue for 2-4 weeks.  Amount may vary.    · Call your physician for heavy bleeding which means  "soaking more than 1 pad per hour    BIRTH CONTROL  · It is possible to become pregnant at any time after delivery and while breastfeeding.  · Plan to discuss a method of birth control with your physician at your follow up visit. visit.    DIET AND ELIMINATION  · Eating more fiber (bran cereal, fruits, and vegetables) and drinking plenty of fluids will help to avoid constipation.  · Urinary frequency after childbirth is normal.    POSTPARTUM BLUES  During the first few days after birth, you may experience a sense of the \"blues\" which may include impatience, irritability or even crying.  These feeling come and go quickly.  However, as many as 1 in 10 women experience emotional symptoms known as postpartum depression.    Postpartum depression:  May start as early as the second or third day after delivery or take several weeks or months to develop.  Symptoms of \"blues\" are present, but are more intense:  Crying spells; loss of appetite; feelings of hopelessness or loss of control; fear of touching the baby; over concern or no concern at all about the baby; little or no concern about your own appearance/caring for yourself; and/or inability to sleep or excessive sleeping.  Contact your physician if you are experiencing any of these symptoms.    Crisis Hotline:  · [place] Crisis Hotline:  1-371-CEOGUBF  Or 1-972.270.3782  · [place] Crisis Hotline:  1-981.572.7666  Or 663-703-7135    PREVENTING SHAKEN BABY:  If you are angry or stressed, PUT THE BABY IN THE CRIB, step away, take some deep breaths, and wait until you are calm to care for the baby.  DO NOT SHAKE THE BABY.  You are not alone, call a supporter for help.    · Crisis Call Center 24/7 crisis line 574-873-8495 or 1-684.587.3062  · You can also text them, text \"ANSWER\" to 939652    QUIT SMOKING/TOBACCO USE:  I understand the use of any tobacco products increases my chance of suffering from future heart disease and could cause other illnesses which may shorten my " life. Quitting the use of tobacco products is the single most important thing I can do to improve my health. For further information on smoking / tobacco cessation call a Toll Free Quit Line at 1-482.820.6246 (*National [employer] [place]) or 1-749.812.9028 (American Lung Association) or you can access the web based program at www.lungusa.org.    · [place] Tobacco Users Help Line:  (975) 462-1513       Toll Free: 1-780.108.7254  · Quit Tobacco Program [facility] (321)762-6351    DEPRESSION / SUICIDE RISK:  As you are discharged from this [facility], it is important to learn how to keep safe from harming yourself.    Recognize the warning signs:  · Abrupt changes in personality, positive or negative- including increase in energy   · Giving away possessions  · Change in eating patterns- significant weight changes-  positive or negative  · Change in sleeping patterns- unable to sleep or sleeping all the time   · Unwillingness or inability to communicate  · Depression  · Unusual sadness, discouragement and loneliness  · Talk of wanting to die  · Neglect of personal appearance   · Rebelliousness- reckless behavior  · Withdrawal from people/activities they love  · Confusion- inability to concentrate     If you or a loved one observes any of these behaviors or has concerns about self-harm, here's what you can do:  · Talk about it- your feelings and reasons for harming yourself  · Remove any means that you might use to hurt yourself (examples: pills, rope, extension cords, firearm)  · Get professional help from the community (Mental Health, Substance Abuse, psychological counseling)  · Do not be alone:Call your Safe Contact- someone whom you trust who will be there for you.  · Call your local CRISIS HOTLINE 381-0576 or 903-565-5136  · Call your local Children's Mobile Crisis Response Team [place] (781) 919-2015 or www.gantto  · Call the toll free National Suicide Prevention  Hotlines   · National Suicide Prevention Lifeline 119-621-ARWT (7232)  · National Hope Line Network 800-SUICIDE (386-4086)    DISCHARGE SURVEY:  Thank you for choosing [facility].  We hope we provided you with very good care.  You may be receiving a survey in the mail.  Please fill it out.  Your opinion is valuable to us.    ADDITIONAL EDUCATIONAL MATERIALS GIVEN TO PATIENT:        My signature on this form indicates that:  1.  I have reviewed and understand the above information  2.  My questions regarding this information have been answered to my satisfaction.  3.  I have formulated a plan with my discharge nurse to obtain my prescribed medication for home.

## 2019-04-09 NOTE — LACTATION NOTE
This note was copied from a baby's chart.  Met with parents/baby before discharge. Baby is now about 53 hrs old. Baby is crying and rooting. Mom states that she has nursed baby almost every hour last night. Placed baby on Mom's upright on Mom's chest and she rooted down to her nipple. Mom is using cross-cradle hold. Helped her get baby's torso flat against Mom's instead of turned outward. Encouraged mother to hold breast with a U hold, fingers away from the nipple so baby has a larger area to latch onto. Baby latched quickly and nursed vigorously. Showed Mom how to slide baby slightly downward to bring the chin into her breast and get a deeper latch. Baby nursed for about 10 min, moved and changed latch into a shallow, small mouth latch. Showed mother how to recognize this kind of latch and she detached baby. Baby then fell asleep.    Discussed ways to keep baby awake and actively nursing. How to recognize a shallow latch. Discussed starting to  pump for work in about a month. Parents asked about pacifier use. Encouraged judicious use of pacifier after other comfort methods tried on baby.     Plan for home is to feed baby at least 8 times/24 hrs (at least every 2-3 hrs for now). Feed on demand, watch for feeding cues.  Encouraged to keep track of intake and output. When latching baby start with nipple at baby's nose, baby's torso against yours. Stroke nipple downward to get baby to open her mouth wide. Wait for the big mouth and then latch. May have to move baby slightly downward to get her chin into Mom's breast. Use cross-cradle hold for now but try others like the football hold. Use the lactation resources discussed that are available as needed, especially if Mom's nipples are getting sore.

## 2019-04-11 ENCOUNTER — ANESTHESIA (OUTPATIENT)
Dept: OBGYN | Facility: MEDICAL CENTER | Age: 34
End: 2019-04-11
Payer: COMMERCIAL

## 2019-05-14 ENCOUNTER — HOSPITAL ENCOUNTER (OUTPATIENT)
Dept: LAB | Facility: MEDICAL CENTER | Age: 34
End: 2019-05-14
Attending: OBSTETRICS & GYNECOLOGY
Payer: COMMERCIAL

## 2019-05-14 PROCEDURE — 369999 HCHG MISC LAB CHARGE

## 2019-05-14 PROCEDURE — 88142 CYTOPATH C/V THIN LAYER: CPT

## 2019-05-30 LAB — TEST NAME 95000: NORMAL

## 2019-06-18 ENCOUNTER — HOSPITAL ENCOUNTER (OUTPATIENT)
Dept: LAB | Facility: MEDICAL CENTER | Age: 34
End: 2019-06-18
Attending: OBSTETRICS & GYNECOLOGY
Payer: COMMERCIAL

## 2019-06-18 PROCEDURE — 88175 CYTOPATH C/V AUTO FLUID REDO: CPT

## 2019-06-19 LAB — CYTOLOGY REG CYTOL: NORMAL

## 2019-12-14 ENCOUNTER — HOSPITAL ENCOUNTER (OUTPATIENT)
Dept: RADIOLOGY | Facility: MEDICAL CENTER | Age: 34
End: 2019-12-14
Attending: NURSE PRACTITIONER
Payer: COMMERCIAL

## 2019-12-31 ENCOUNTER — APPOINTMENT (OUTPATIENT)
Dept: RADIOLOGY | Facility: MEDICAL CENTER | Age: 34
End: 2019-12-31
Attending: NURSE PRACTITIONER
Payer: COMMERCIAL

## 2020-05-06 ENCOUNTER — TELEMEDICINE (OUTPATIENT)
Dept: BEHAVIORAL HEALTH | Facility: CLINIC | Age: 35
End: 2020-05-06
Payer: COMMERCIAL

## 2020-05-06 DIAGNOSIS — F43.21 ADJUSTMENT DISORDER WITH DEPRESSED MOOD: ICD-10-CM

## 2020-05-06 PROCEDURE — 90791 PSYCH DIAGNOSTIC EVALUATION: CPT | Mod: 95,CR | Performed by: MARRIAGE & FAMILY THERAPIST

## 2020-05-06 NOTE — BH THERAPY
[facility]  INITIAL ASSESSMENT    Name: Jihan Henderson  MRN: [id number]  : 1985  Age: 34 y.o.  Date of assessment: 2020  PCP: Jeanine March M.D.  Persons in attendance: Patient  Total session time: 45 minutes      CHIEF COMPLAINT AND HISTORY OF PRESENTING PROBLEM:  (as stated by Patient):  Jihan Henderson is a 34 y.o., White female referred for assessment by self.  Primary presenting issue includes   Chief Complaint   Patient presents with   • Initial  Evaluation   • Depression   . Met with the patient per their request via (HIPPA compliant) Zoom video conference to accommodate state imposed restrictions on social contact due to the COVID-19 pandemic. Haritha states that she has had problems regulating her mood since she had her first child one year ago. Though her post partum emotional mood swings and depression is better than it was 6 months ago she still has mood swings, lacks patience, and has angry outbursts at times. She is motivated to address these symptoms before they begin to affect her daughter and her marriage.    FAMILY/SOCIAL HISTORY  Current living situation/household members: Haritha lives with her  of 6 years and their one year old daughter and their five dogs.    Relevant family history/structure/dynamics: Haritah was raised by her biological parents and she has one younger brother. Her parents are still  and live in [facility]. Her mother has struggled with depression and drinks every day. Her family restricted the expression of emotions with the exception of anger. She still restricts the expression of feelings except anger.    Current family/social stressors: Haritha was planning to return to work part time just before the pandemic restricted movement. She has postponed her return and isn't sure if she will have the job when businesses are allowed to open again.    Quality/quantity of current family and/or social support: Her  and  her best friend are her main supports though she admits to having difficulty confiding in anyone when she feels down or is struggling  Does patient report a family history of behavioral health issues, diagnoses, or treatment? Yes  Family History   Problem Relation Age of Onset   • Hypertension Mother    • Hyperlipidemia Mother    • Hypertension Father    • Hyperlipidemia Father    • Cancer Brother 20        lymphoma    • Cancer Maternal Grandfather         pancreatic CA   • Cancer Paternal Grandmother         BR/lung CA   • Diabetes Paternal Grandfather         BEHAVIORAL HEALTH TREATMENT HISTORY  Does patient/parent report a history of prior behavioral health treatment for patient? She was treated out patient as a child for a mild anxiety disorder that resolved with treatment.     History of untreated behavioral health issues identified? No    MEDICAL HISTORY  Primary care behavioral health screenings: Patient Health Questionaire                  If depressive symptoms identified deferred to follow up visit unless specifically addressed in assesment and plan.         Past medical/surgical history:   Past Medical History:   Diagnosis Date   • Anxiety    • Depression    • Insomnia       History reviewed. No pertinent surgical history.     Medication Allergies:  Patient has no known allergies.   Medical history provided by patient during current evaluation: No current medical concerns.    Does patient/parent report nutritional concerns? Yes, she has been unable to make up the calories since breastfeeding and she is underweight.  Does patient/parent report change in appetite or weight loss/gain? Yes  Does patient/parent report history of eating disorder symptoms? No    Does patient/parent report physical pain? Yes   Indicate if pain is acute or chronic, and location: neck and shoulder pain that she has had for some months.   Pain scale rating:       Does patient/parent report functional impact of medical,  developmental, or pain issues?   no    EDUCATIONAL/LEARNING HISTORY  Is patient currently enrolled in a school/educational program? no  Highest grade completed? BA in urban planning  Diagnosed Learning Disorder(s)? no    EMPLOYMENT/RESOURCES  Is the patient currently employed? No  Does the patient/parent report adequate financial resources? Yes  Does patient identify impact of presenting issue on work functioning? No  Work or income-related stressors:  Possible job loss due to pandemic     HISTORY:  Does patient report current or past enlistment? No      SPIRITUAL/CULTURAL/IDENTITY:  What are the patient’s/family’s spiritual beliefs or practices? Raised Religious, does not attend Hinduism  What is the patient’s cultural or ethnic background/identity?   How does the patient identify their sexual orientation? heterosexual  How does the patient identify their gender? female  Does the patient identify any spiritual/cultural/identity factors as relevant to the presenting issue? No    LEGAL HISTORY  Has the patient ever been involved with juvenile, adult, or family legal systems? No   [If yes, trigger section below:]  Does patient report ever being a victim of a crime?  No  Does patient report involvement in any current legal issues?  No  Does patient report ever being arrested or committing a crime? No  Does patient report any current agency (parole/probation/CPS/) involvement? No    ABUSE/NEGLECT/TRAUMA SCREENING  Does patient report feeling “unsafe” in his/her home, or afraid of anyone? No  Does patient report any history of physical, sexual, or emotional abuse? No  Is there evidence of neglect by self? No  Is there evidence of neglect by a caregiver? No  Does the patient/parent report any history of CPS/APS/police involvement related to suspected abuse/neglect or domestic violence? No  Does the patient/parent report any other history of potentially traumatic life events? No  Based on the  information provided during the current assessment, is a mandated report of suspected abuse/neglect being made?  No     SAFETY ASSESSMENT - SELF  Does patient acknowledge current or past symptoms of dangerousness to self? No, mild S/I in past but not now  Does significant other report patient has current or past symptoms of dangerousness to self? No    Recent change in frequency/specificity/intensity of suicidal thoughts or self-harm behavior? NO  Current access to firearms, medications, or other identified means of suicide/self-harm?N/A  If yes, willing to restrict access to means of suicide/self-harm? N/A  Protective factors present: Future-oriented, Good impulse control, Optimism, Positive coping skills, Positive self-efficacy and Strong family connections    Current Suicide Risk: Not applicable  Crisis Safety Plan completed and copy given to patient: No    SAFETY ASSESSMENT - OTHERS  Does patient report past symptoms of aggressive behavior or risk to others? No  Does parent/significant other acknowledge current or past symptoms of aggressive behavior or risk to others? NO  Recent change in frequency/specificity/intensity of thoughts or threats to harm others? N/A  Current access to firearms/other identified means of harm?N/A  If yes, willing to restrict access to weapons/means of harm?N/A  Protective factors present: Good frustration tolerance, Well-developed sense of empathy, Positive impulse-control and Stable relationships    Current Homicide Risk:  Not applicable  Crisis Safety Plan completed and copy given to patient? No  Based on information provided during the current assessment, is a mandated “duty to warn” being exercised? No    SUBSTANCE USE/ADDICTION HISTORY    Is there a family history of substance use/addiction? Yes  Does patient report use of/dependence on substances? No  Last time patient used alcohol: very occasional social drinking    Last time patient used marijuana: does not use    Any other  "street drugs ever tried even once? Used marijuana in high school, but not since.  Any use of prescription medications/pills without a prescription, or for reasons others than originally prescribed?  No  Any other addictive behavior reported (gambling, shopping, sex)? No             What consequences does the patient associate with any of the above substance use and or addictive behaviors? None    Patient’s motivation/readiness for change: Addiction not an issue    [] Patient denies use of any substance/addictive behaviors    STRENGTHS/ASSETS  Strengths Identified by interviewer: Insight into problems, Evidence of good judgement, Self-awareness, Family suppport, Stable relationships, Problem-solving skills and Social skills  Strengths Identified by patient: \"fair, organized, productive, practical\"    MENTAL STATUS/OBSERVATIONS   Participation: Active verbal participation, Attentive and Engaged  Grooming: Casual and Neat  Orientation:Alert and Fully Oriented   Behavior: Calm  Eye contact: Good   Mood:Euthymic  Affect:Flexible and Congruent with content  Thought process: Logical and Goal-directed  Thought content:  Within normal limits  Speech: Rate within normal limits and Volume within normal limits  Perception: Within normal limits  Memory: No gross evidence of memory deficits  Insight: Good  Judgment:  Good  Other:    Family/couple interaction observations: none observed      CLINICAL FORMULATION: Haritha functions at a high level and her current functioning remains intact. She is very self aware and wants to better regulate her moods and manage her feelings. The changes in mood seem related to the birth of her child. She is not on any medication and she does not want to pursue medication assistance, nor do I think that is warranted. She is a good candidate for psychotherapy and will likely be a motivated client.      DIAGNOSTIC IMPRESSION(S):  1. Adjustment disorder with depressed mood          IDENTIFIED " NEEDS/PLAN:  [If any of these marked, trigger DISPOSITION list]  Mood/anxiety  Actively being addressed by [facility]    Does patient express agreement with the above plan? Yes     Referral appointment(s) scheduled? Yes       DELVIS Glover.

## 2020-05-14 ENCOUNTER — TELEMEDICINE (OUTPATIENT)
Dept: BEHAVIORAL HEALTH | Facility: CLINIC | Age: 35
End: 2020-05-14
Payer: COMMERCIAL

## 2020-05-14 DIAGNOSIS — F43.21 ADJUSTMENT DISORDER WITH DEPRESSED MOOD: ICD-10-CM

## 2020-05-14 PROCEDURE — 90834 PSYTX W PT 45 MINUTES: CPT | Mod: 95,CR | Performed by: MARRIAGE & FAMILY THERAPIST

## 2020-05-14 NOTE — BH THERAPY
[facility]  Therapy Progress Note    Patient Name: Jihan Henderson  Patient MRN: [id number]  Today's Date: 5/14/2020     Type of session:Individual psychotherapy  Length of session: 45 minutes  Persons in attendance:Patient    Subjective/New Info: Met with the patient per their request and with their consent via (HIPPA compliant) Zoom video conference to accommodate state imposed restrictions on social contact due to the COVID-19 pandemic. Haritha's daughter hasn't been sleeping well the last week so she has been irritable due to the lack of sleep. 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 When she is feeling she shouldn't ask for help she is also angry at him for not noticing when she does need help. She expresses this anger in indirect ways that he finds confusing. She notices he feels less stressed by their daughter's needs and this makes her feel inadequate as a mom. However, she interprets any difficulties her child may be having as a direct reflection on her performance as a mom, and her  doesn't do this to himself. He sometimes forgets to do things she has asked of him and while he admits and feels bad about his forgetfulness it hurts her that he can't make it more important to answer her requests. Asked her to be more open and direct in expressing what she would like him to do to help, no matter how obvious she thinks it should be to him. Told her to expect to have to deal with some discomfort in asking for help.    Objective/Observations:   Participation: Active verbal participation, Engaged and Open to feedback   Grooming: Casual   Cognition: Alert and Fully Oriented   Eye contact: Good   Mood: Euthymic   Affect: Flexible and Congruent with content   Thought process: Logical   Speech: Rate within normal limits and Volume within normal limits   Other:     Diagnoses:   1. Adjustment disorder with  depressed mood         Current risk:   SUICIDE: Not applicable   Homicide: Not applicable   Self-harm: Not applicable   Relapse: Not applicable   Other:    Safety Plan reviewed? Not Indicated   If evidence of imminent risk is present, intervention/plan:     Therapeutic Intervention(s): Cognitive modification, Communication skills, Establish rapport, Interpersonal effectiveness skills, Parenting skills, Self-care skills and Stressors assessed     Treatment Goal(s)/Objective(s) addressed: Cognitive Distortions    Goal: Identify and address cognitive distortions  Identify cognitive distortions and explore their origins.  Use worksheet to address identified distortions.  Increase awareness of cognitive distortions impact on emotions and mood.  Manage emotions and mood using rational responses to cognitive distortions.  Increase awareness of cognitive distortions impact on self concept.  Identify a rational self concept free of cognitive distortions.     Progress toward Treatment Goals: No change    Plan:  - Next appointment scheduled:  5/20/2020  - Patient is in agreement with the above plan:  YES    FABRIZIO Glover  5/14/2020

## 2020-05-20 ENCOUNTER — TELEMEDICINE (OUTPATIENT)
Dept: BEHAVIORAL HEALTH | Facility: CLINIC | Age: 35
End: 2020-05-20
Payer: COMMERCIAL

## 2020-05-20 DIAGNOSIS — F43.21 ADJUSTMENT DISORDER WITH DEPRESSED MOOD: ICD-10-CM

## 2020-05-20 PROCEDURE — 90834 PSYTX W PT 45 MINUTES: CPT | Mod: 95,CR | Performed by: MARRIAGE & FAMILY THERAPIST

## 2020-05-20 NOTE — BH THERAPY
[facility]  Therapy Progress Note    Patient Name: Jihan Henderson  Patient MRN: [id number]  Today's Date: 5/20/2020     Type of session:Individual psychotherapy  Length of session: 45 minutes  Persons in attendance:Patient    Subjective/New Info: Met with the patient per their request and with their consent via (HIPPA compliant) Zoom video conference to accommodate state imposed restrictions on social contact due to the COVID-19 pandemic. Haritha has had several sleepless nights with the baby in the past week and feels tired and overwhelmed. She and her  both tend to avoid asking for help or being explicit about their needs in the moment. This results in Haritha sometimes asking for help at a bad time for her  and then discovering he really had other stuff on his plate. This fuels her reluctance to ask. Suggested they talk about being more clear with each other about where they are at both emotionally and in terms of tasks. Haritha was concerned about a friendship that is at times demanding of her time. She recently had to put her friend's request for her time off for later and her friend acted hurt and withdrawn about it. We discussed her being assertive and kind and evaluating the cost to her of meeting others needs at her own expense. This is something familiar to Haritha because her mother will sometimes lean on her for emotional support that isn't appropriate to their relationship as mother daughter. Haritha's mother seems very unhappy in her marriage and looks to Haritha to meet some of her emotional needs.    Objective/Observations:   Participation: Active verbal participation, Engaged and Open to feedback   Grooming: Casual and Neat   Cognition: Alert and Fully Oriented   Eye contact: Good   Mood: Euthymic   Affect: Flexible and Congruent with content   Thought process: Logical   Speech: Rate within normal limits and Volume within normal limits   Other:     Diagnoses:   1. Adjustment  disorder with depressed mood         Current risk:   SUICIDE: Not applicable   Homicide: Not applicable   Self-harm: Not applicable   Relapse: Not applicable   Other:    Safety Plan reviewed? Not Indicated   If evidence of imminent risk is present, intervention/plan:     Therapeutic Intervention(s): Cognitive modification, Communication skills, Self-care skills and Stressors assessed  Treatment Goal(s)/Objective(s) addressed: Assertive Skills    Goal: Acquire, practice, and implement assertive skills.  Psychoeducation and roleplay of assertiveness skills in communication.   Identify needs and wants and communicate these in an effective manner.  Learn difference between aggressive, passive, passive/aggressive, and assertive behavior.  Identify situations in which assertive skills should be exercised to maintain personal boundaries.  Identify situations where there is a deliberate choice not to implement assertive skills for personal safety or well-being.  Expand repertoire of assertive techniques.  Mentally practice assertive techniques for potentially challenging situations.  Acknowledge personal autonomy and right to set personal boundaries.  Recognize assertive communication in others and respond in a non-defensive, effective manner.     Progress toward Treatment Goals: No change    Plan:  - Next appointment scheduled:  5/28/2020  - Patient is in agreement with the above plan:  YES    FABRIZIO Glover  5/20/2020

## 2020-05-28 ENCOUNTER — TELEMEDICINE (OUTPATIENT)
Dept: BEHAVIORAL HEALTH | Facility: CLINIC | Age: 35
End: 2020-05-28
Payer: COMMERCIAL

## 2020-05-28 DIAGNOSIS — F43.21 ADJUSTMENT DISORDER WITH DEPRESSED MOOD: ICD-10-CM

## 2020-05-28 PROCEDURE — 90834 PSYTX W PT 45 MINUTES: CPT | Mod: 95,CR | Performed by: MARRIAGE & FAMILY THERAPIST

## 2020-06-02 NOTE — BH THERAPY
" [facility]  Therapy Progress Note    Patient Name: Jihan Henderson  Patient MRN: [id number]  Today's Date: 6/2/2020     Type of session:Individual psychotherapy  Length of session: 45 minutes  Persons in attendance:Patient    Subjective/New Info: Met with the patient per their request and with their consent via (HIPPA compliant) Zoom video conference to accommodate state imposed restrictions on social contact due to the COVID-19 pandemic. Haritha complains of lack of sleep due to her child's poor sleep. However, she and her  have both found the sleep stories on the [employer]one helpful for getting back to sleep when the baby gets them up during the night. She described \"waves of bad\" that can last up to a few days. Some are triggered by sleep deprivation, but she finds her mom triggers some of them. She has noticed her mother drinks more since her shelter and is sometimes more confrontational when drinking. She also cleans Haritha's house sometimes when visiting and ignores things Haritha has asked for help with. She and her  recently had a good discussion about their division of chores and agreed they need to communicate more clearly about it.     Objective/Observations:   Participation: Active verbal participation, Attentive and Engaged   Grooming: Casual and Neat   Cognition: Alert and Fully Oriented   Eye contact: Good   Mood: Depressed   Affect: Flexible, Congruent with content and Anxious   Thought process: Logical and Goal-directed   Speech: Rate within normal limits and Volume within normal limits   Other:     Diagnoses:   1. Adjustment disorder with depressed mood         Current risk:   SUICIDE: Not applicable   Homicide: Not applicable   Self-harm: Not applicable   Relapse: Not applicable   Other:    Safety Plan reviewed? Not Indicated   If evidence of imminent risk is present, intervention/plan:     Therapeutic Intervention(s): Cognitive modification, Communication skills, " Parenting skills, Self-care skills and Stressors assessed  Treatment Goal(s)/Objective(s) addressed: Anxiety     Goal: Reduce anxiety and improve coping skills   Learn about the purpose and normal experience of anxious feelings.  Identify the experiences of anxiety that have excessive intensity in relation to the situation.  Recognize and plan for top three anxiety-provoking situations.  Identify cognitive distortions that amplify anxious feelings.  Modify cognitive distortions- identify and rehearse more logical responses to targeted situations.   Modify cognitive distortions of self concept to acknowledge resilience in dealing with stressors.  Develop strategies for thought distraction when negatively fixating on the future.   Practice relaxation exercise daily and pair with sensory cue.  Use sensory cue when confronting anxiety provoking situations.  Reduce and eventually eliminate excessive anxiety and panic attacks using emotional conditioning tools.     Progress toward Treatment Goals: No change    Plan:  - Next appointment scheduled:  6/11/2020  - Patient is in agreement with the above plan:  YES    FABRIZIO Glover  6/2/2020

## 2020-06-11 ENCOUNTER — TELEMEDICINE (OUTPATIENT)
Dept: BEHAVIORAL HEALTH | Facility: CLINIC | Age: 35
End: 2020-06-11
Payer: COMMERCIAL

## 2020-06-11 DIAGNOSIS — F43.21 ADJUSTMENT DISORDER WITH DEPRESSED MOOD: ICD-10-CM

## 2020-06-11 PROCEDURE — 90834 PSYTX W PT 45 MINUTES: CPT | Mod: 95,CR | Performed by: MARRIAGE & FAMILY THERAPIST

## 2020-06-11 NOTE — BH THERAPY
[facility]  Therapy Progress Note    Patient Name: Jihan Henderson  Patient MRN: [id number]  Today's Date: 6/11/2020     Type of session:Individual psychotherapy  Length of session: 45 minutes  Persons in attendance:Patient    Subjective/New Info: Met with the patient per their request and with their consent via (HIPPA compliant) Zoom video conference to accommodate state imposed restrictions on social contact due to the COVID-19 pandemic. Haritha has slept better this week because her daughter has slept better. They started sleep training with her that they had stopped some time back due an illness. She is hopeful the improvements will stick. Talked about giving herself more lisha and compassion around her parenting and not expecting that if she a perfect mom her daughter won't have any troubles.  Discussed disappointment with her own mother for not taking her career more seriously and being dismissive of Haritha's feeling about how difficult she finds managing her child at times. Haritha had hoped her mother could be more supportive to her at this time in her life, but her mother at times seems more focused on self than she ever has.    Objective/Observations:   Participation: Active verbal participation, Engaged and Open to feedback   Grooming: Casual and Neat   Cognition: Alert and Fully Oriented   Eye contact: Good   Mood: Euthymic   Affect: Flexible and Congruent with content   Thought process: Logical and Goal-directed   Speech: Rate within normal limits and Volume within normal limits   Other:     Diagnoses:   1. Adjustment disorder with depressed mood         Current risk:   SUICIDE: Not applicable   Homicide: Not applicable   Self-harm: Not applicable   Relapse: Not applicable   Other:    Safety Plan reviewed? Not Indicated   If evidence of imminent risk is present, intervention/plan:     Therapeutic Intervention(s): Cognitive modification, Communication skills, Distress tolerance skills,  Parenting skills, Self-care skills and Stressors assessed  Treatment Goal(s)/Objective(s) addressed: Sleep Problems   Goal: Get 7-8 hours of restful sleep each night     Limit consumption of food and drinks before bed.   Limit intake of caffeine (coffee, tea, soda) and chocolate after 12 pm.  Cut back on things that may impede normal sleep patterns (e.g., alcohol and some medications.)   Keep a regular sleep schedule 6-7 days per week.  Have 30 minutes of quiet time before going to bed each night.  Develop a pre-bedtime routine.  Avoid overly stimulating shows/movies/video games 60 minutes before bedtime.   Use bedroom for sleeping and sexual activity- limit use of bedroom for work, television etc.  If not asleep in 20 minutes, get up and do something for a bit, rather than try to force sleep.   Leave a paper and pen at bedside to write down worries down instead of ruminating on them.   Keep the bedroom cool, dark, and quiet at bedtime.   Use a sound masking device or music to block out noise as necessary.  Refrain from use of computer screen for 60 minutes before bed.  Use a sleep application to listen to bedtime stories, meditate, or listen to calming sounds/music.  Do not nap during the day.  Use a light box in the morning (if it's dark when you rise) to help set sleep-wake cycle.     Progress toward Treatment Goals: No change    Plan:  - Next appointment scheduled:  7/1/2020  - Patient is in agreement with the above plan:  YES    FABRIZIO Glover  6/11/2020

## 2020-06-24 ENCOUNTER — HOSPITAL ENCOUNTER (OUTPATIENT)
Dept: LAB | Facility: MEDICAL CENTER | Age: 35
End: 2020-06-24
Attending: OBSTETRICS & GYNECOLOGY
Payer: COMMERCIAL

## 2020-06-24 PROCEDURE — 87624 HPV HI-RISK TYP POOLED RSLT: CPT

## 2020-06-24 PROCEDURE — 88175 CYTOPATH C/V AUTO FLUID REDO: CPT

## 2020-06-25 LAB
CYTOLOGY REG CYTOL: NORMAL
HPV HR 12 DNA CVX QL NAA+PROBE: NEGATIVE
HPV16 DNA SPEC QL NAA+PROBE: NEGATIVE
HPV18 DNA SPEC QL NAA+PROBE: NEGATIVE
SPECIMEN SOURCE: NORMAL

## 2020-07-01 ENCOUNTER — TELEMEDICINE (OUTPATIENT)
Dept: BEHAVIORAL HEALTH | Facility: CLINIC | Age: 35
End: 2020-07-01
Payer: COMMERCIAL

## 2020-07-01 DIAGNOSIS — F43.21 ADJUSTMENT DISORDER WITH DEPRESSED MOOD: ICD-10-CM

## 2020-07-01 PROCEDURE — 90834 PSYTX W PT 45 MINUTES: CPT | Mod: 95,CR | Performed by: MARRIAGE & FAMILY THERAPIST

## 2020-07-01 NOTE — BH THERAPY
[facility]  Therapy Progress Note    Patient Name: Jihan Henderson  Patient MRN: [id number]  Today's Date: 7/1/2020     Type of session:Individual psychotherapy  Length of session: 45 minutes  Persons in attendance:Patient    Subjective/New Info: Met with the patient per their request and with their consent via (HIPPA compliant) Zoom video conference to accommodate state imposed restrictions on social contact due to the COVID-19 pandemic. Haritha is starting a part time job next week. She has started her daughter in day care two times a week as well. She realizes she is prioritizing her to do list over her self care and this results in her being impatient with her family and then criticizing herself for her impatience. She knows she needs a certain amount of time to herself, but has given that up since having a child. We discussed strategies to deal with perfectionism. Her daughter has been sleeping well for the last months, but she still sometimes wakes up and has trouble going back to sleep. Discussed sleep hygiene.     Objective/Observations:   Participation: Active verbal participation   Grooming: Neat   Cognition: Alert and Fully Oriented   Eye contact: Good   Mood: Anxious   Affect: Flexible and Congruent with content   Thought process: Logical and Goal-directed   Speech: Rate within normal limits and Volume within normal limits   Other:     Diagnoses:   1. Adjustment disorder with depressed mood         Current risk:   SUICIDE: Not applicable   Homicide: Not applicable   Self-harm: Not applicable   Relapse: Not applicable   Other:    Safety Plan reviewed? Not Indicated   If evidence of imminent risk is present, intervention/plan:     Therapeutic Intervention(s): Cognitive modification, Self-care skills and Stressors assessed     Treatment Goal(s)/Objective(s) addressed: Cognitive Distortions    Goal: Identify and address cognitive distortions  Identify cognitive distortions and explore their  origins.  Use worksheet to address identified distortions.  Increase awareness of cognitive distortions impact on emotions and mood.  Manage emotions and mood using rational responses to cognitive distortions.  Increase awareness of cognitive distortions impact on self concept.  Identify a rational self concept free of cognitive distortions.     Progress toward Treatment Goals: Mild improvement    Plan:  - Patient is in agreement with the above plan:  YES  - Patient given all available referral options for continued therapy. She will schedule appointments as needed.    FABRIZIO Glover  7/1/2020

## 2021-01-28 ENCOUNTER — HOSPITAL ENCOUNTER (OUTPATIENT)
Dept: LAB | Facility: MEDICAL CENTER | Age: 36
End: 2021-01-28
Attending: OBSTETRICS & GYNECOLOGY
Payer: COMMERCIAL

## 2021-01-28 LAB
ABO GROUP BLD: NORMAL
BASOPHILS # BLD AUTO: 0.5 % (ref 0–1.8)
BASOPHILS # BLD: 0.05 K/UL (ref 0–0.12)
BLD GP AB SCN SERPL QL: NORMAL
EOSINOPHIL # BLD AUTO: 0.06 K/UL (ref 0–0.51)
EOSINOPHIL NFR BLD: 0.6 % (ref 0–6.9)
ERYTHROCYTE [DISTWIDTH] IN BLOOD BY AUTOMATED COUNT: 44 FL (ref 35.9–50)
HBV SURFACE AG SER QL: NORMAL
HCT VFR BLD AUTO: 40 % (ref 37–47)
HCV AB SER QL: NORMAL
HGB BLD-MCNC: 13 G/DL (ref 12–16)
HIV 1+2 AB+HIV1 P24 AG SERPL QL IA: NORMAL
IMM GRANULOCYTES # BLD AUTO: 0.07 K/UL (ref 0–0.11)
IMM GRANULOCYTES NFR BLD AUTO: 0.7 % (ref 0–0.9)
LYMPHOCYTES # BLD AUTO: 2.27 K/UL (ref 1–4.8)
LYMPHOCYTES NFR BLD: 22.2 % (ref 22–41)
MCH RBC QN AUTO: 31 PG (ref 27–33)
MCHC RBC AUTO-ENTMCNC: 32.5 G/DL (ref 33.6–35)
MCV RBC AUTO: 95.5 FL (ref 81.4–97.8)
MONOCYTES # BLD AUTO: 0.66 K/UL (ref 0–0.85)
MONOCYTES NFR BLD AUTO: 6.5 % (ref 0–13.4)
NEUTROPHILS # BLD AUTO: 7.12 K/UL (ref 2–7.15)
NEUTROPHILS NFR BLD: 69.5 % (ref 44–72)
NRBC # BLD AUTO: 0 K/UL
NRBC BLD-RTO: 0 /100 WBC
PLATELET # BLD AUTO: 261 K/UL (ref 164–446)
PMV BLD AUTO: 10.7 FL (ref 9–12.9)
RBC # BLD AUTO: 4.19 M/UL (ref 4.2–5.4)
RH BLD: NORMAL
RUBV AB SER QL: 185 IU/ML
TREPONEMA PALLIDUM IGG+IGM AB [PRESENCE] IN SERUM OR PLASMA BY IMMUNOASSAY: NORMAL
WBC # BLD AUTO: 10.2 K/UL (ref 4.8–10.8)

## 2021-01-28 PROCEDURE — 36415 COLL VENOUS BLD VENIPUNCTURE: CPT

## 2021-01-28 PROCEDURE — 86780 TREPONEMA PALLIDUM: CPT

## 2021-01-28 PROCEDURE — 87389 HIV-1 AG W/HIV-1&-2 AB AG IA: CPT

## 2021-01-28 PROCEDURE — 87591 N.GONORRHOEAE DNA AMP PROB: CPT

## 2021-01-28 PROCEDURE — 87340 HEPATITIS B SURFACE AG IA: CPT

## 2021-01-28 PROCEDURE — 87086 URINE CULTURE/COLONY COUNT: CPT

## 2021-01-28 PROCEDURE — 86901 BLOOD TYPING SEROLOGIC RH(D): CPT

## 2021-01-28 PROCEDURE — 85025 COMPLETE CBC W/AUTO DIFF WBC: CPT

## 2021-01-28 PROCEDURE — 88175 CYTOPATH C/V AUTO FLUID REDO: CPT

## 2021-01-28 PROCEDURE — 86762 RUBELLA ANTIBODY: CPT

## 2021-01-28 PROCEDURE — 86803 HEPATITIS C AB TEST: CPT

## 2021-01-28 PROCEDURE — 87491 CHLMYD TRACH DNA AMP PROBE: CPT

## 2021-01-28 PROCEDURE — 86850 RBC ANTIBODY SCREEN: CPT

## 2021-01-28 PROCEDURE — 86900 BLOOD TYPING SEROLOGIC ABO: CPT

## 2021-01-29 LAB
C TRACH DNA GENITAL QL NAA+PROBE: NEGATIVE
CYTOLOGY REG CYTOL: NORMAL
N GONORRHOEA DNA GENITAL QL NAA+PROBE: NEGATIVE
SPECIMEN SOURCE: NORMAL

## 2021-01-30 LAB
BACTERIA UR CULT: NORMAL
SIGNIFICANT IND 70042: NORMAL
SITE SITE: NORMAL
SOURCE SOURCE: NORMAL

## 2021-03-01 ENCOUNTER — HOSPITAL ENCOUNTER (OUTPATIENT)
Facility: MEDICAL CENTER | Age: 36
End: 2021-03-01
Attending: OBSTETRICS & GYNECOLOGY
Payer: COMMERCIAL

## 2021-03-01 ENCOUNTER — HOSPITAL ENCOUNTER (OUTPATIENT)
Dept: LAB | Facility: MEDICAL CENTER | Age: 36
End: 2021-03-01
Attending: OBSTETRICS & GYNECOLOGY
Payer: COMMERCIAL

## 2021-03-01 PROCEDURE — 82105 ALPHA-FETOPROTEIN SERUM: CPT

## 2021-03-01 PROCEDURE — 36415 COLL VENOUS BLD VENIPUNCTURE: CPT

## 2021-03-05 LAB
# FETUSES US: NORMAL
AFP MOM SERPL: 0.74
AFP SERPL-MCNC: 25 NG/ML
AGE - REPORTED: 37.9 YR
CURRENT SMOKER: NO
FAMILY MEMBER DISEASES HX: NO
GA METHOD: NORMAL
GA: NORMAL WK
IDDM PATIENT QL: NO
INTEGRATED SCN PATIENT-IMP: NORMAL
SPECIMEN DRAWN SERPL: NORMAL

## 2021-05-12 ENCOUNTER — HOSPITAL ENCOUNTER (OUTPATIENT)
Facility: MEDICAL CENTER | Age: 36
End: 2021-05-12
Attending: OBSTETRICS & GYNECOLOGY
Payer: COMMERCIAL

## 2021-05-12 ENCOUNTER — HOSPITAL ENCOUNTER (OUTPATIENT)
Dept: LAB | Facility: MEDICAL CENTER | Age: 36
End: 2021-05-12
Attending: OBSTETRICS & GYNECOLOGY
Payer: COMMERCIAL

## 2021-05-12 LAB
GLUCOSE 1H P 50 G GLC PO SERPL-MCNC: 130 MG/DL (ref 70–139)
HCT VFR BLD AUTO: 33.2 % (ref 37–47)
HGB BLD-MCNC: 11 G/DL (ref 12–16)
PLATELET # BLD AUTO: 217 K/UL (ref 164–446)
TREPONEMA PALLIDUM IGG+IGM AB [PRESENCE] IN SERUM OR PLASMA BY IMMUNOASSAY: NORMAL

## 2021-05-12 PROCEDURE — 82950 GLUCOSE TEST: CPT

## 2021-05-12 PROCEDURE — 36415 COLL VENOUS BLD VENIPUNCTURE: CPT

## 2021-05-12 PROCEDURE — 85018 HEMOGLOBIN: CPT

## 2021-05-12 PROCEDURE — 86780 TREPONEMA PALLIDUM: CPT

## 2021-05-12 PROCEDURE — 85049 AUTOMATED PLATELET COUNT: CPT

## 2021-05-12 PROCEDURE — 85014 HEMATOCRIT: CPT

## 2021-07-23 ENCOUNTER — HOSPITAL ENCOUNTER (OUTPATIENT)
Dept: LAB | Facility: MEDICAL CENTER | Age: 36
End: 2021-07-23
Attending: OBSTETRICS & GYNECOLOGY
Payer: COMMERCIAL

## 2021-07-23 PROCEDURE — 87081 CULTURE SCREEN ONLY: CPT

## 2021-07-23 PROCEDURE — 87150 DNA/RNA AMPLIFIED PROBE: CPT

## 2021-07-24 LAB — GP B STREP DNA SPEC QL NAA+PROBE: NEGATIVE

## 2021-08-16 ENCOUNTER — HOSPITAL ENCOUNTER (INPATIENT)
Facility: MEDICAL CENTER | Age: 36
LOS: 1 days | End: 2021-08-17
Attending: OBSTETRICS & GYNECOLOGY | Admitting: OBSTETRICS & GYNECOLOGY
Payer: COMMERCIAL

## 2021-08-16 LAB
BASOPHILS # BLD AUTO: 0.4 % (ref 0–1.8)
BASOPHILS # BLD: 0.04 K/UL (ref 0–0.12)
EOSINOPHIL # BLD AUTO: 0.06 K/UL (ref 0–0.51)
EOSINOPHIL NFR BLD: 0.6 % (ref 0–6.9)
ERYTHROCYTE [DISTWIDTH] IN BLOOD BY AUTOMATED COUNT: 43.9 FL (ref 35.9–50)
ERYTHROCYTE [DISTWIDTH] IN BLOOD BY AUTOMATED COUNT: 44.2 FL (ref 35.9–50)
HCT VFR BLD AUTO: 37.5 % (ref 37–47)
HCT VFR BLD AUTO: 39.3 % (ref 37–47)
HGB BLD-MCNC: 12.8 G/DL (ref 12–16)
HGB BLD-MCNC: 13.5 G/DL (ref 12–16)
HOLDING TUBE BB 8507: NORMAL
IMM GRANULOCYTES # BLD AUTO: 0.09 K/UL (ref 0–0.11)
IMM GRANULOCYTES NFR BLD AUTO: 0.9 % (ref 0–0.9)
LYMPHOCYTES # BLD AUTO: 2.79 K/UL (ref 1–4.8)
LYMPHOCYTES NFR BLD: 27.3 % (ref 22–41)
MCH RBC QN AUTO: 33 PG (ref 27–33)
MCH RBC QN AUTO: 33.1 PG (ref 27–33)
MCHC RBC AUTO-ENTMCNC: 34.1 G/DL (ref 33.6–35)
MCHC RBC AUTO-ENTMCNC: 34.4 G/DL (ref 33.6–35)
MCV RBC AUTO: 96.1 FL (ref 81.4–97.8)
MCV RBC AUTO: 96.9 FL (ref 81.4–97.8)
MONOCYTES # BLD AUTO: 0.82 K/UL (ref 0–0.85)
MONOCYTES NFR BLD AUTO: 8 % (ref 0–13.4)
NEUTROPHILS # BLD AUTO: 6.42 K/UL (ref 2–7.15)
NEUTROPHILS NFR BLD: 62.8 % (ref 44–72)
NRBC # BLD AUTO: 0 K/UL
NRBC BLD-RTO: 0 /100 WBC
PLATELET # BLD AUTO: 181 K/UL (ref 164–446)
PLATELET # BLD AUTO: 194 K/UL (ref 164–446)
PMV BLD AUTO: 11 FL (ref 9–12.9)
PMV BLD AUTO: 11.1 FL (ref 9–12.9)
RBC # BLD AUTO: 3.87 M/UL (ref 4.2–5.4)
RBC # BLD AUTO: 4.09 M/UL (ref 4.2–5.4)
SARS-COV+SARS-COV-2 AG RESP QL IA.RAPID: NOTDETECTED
SARS-COV-2 RNA RESP QL NAA+PROBE: NOTDETECTED
SPECIMEN SOURCE: NORMAL
SPECIMEN SOURCE: NORMAL
WBC # BLD AUTO: 10.2 K/UL (ref 4.8–10.8)
WBC # BLD AUTO: 14.4 K/UL (ref 4.8–10.8)

## 2021-08-16 PROCEDURE — U0003 INFECTIOUS AGENT DETECTION BY NUCLEIC ACID (DNA OR RNA); SEVERE ACUTE RESPIRATORY SYNDROME CORONAVIRUS 2 (SARS-COV-2) (CORONAVIRUS DISEASE [COVID-19]), AMPLIFIED PROBE TECHNIQUE, MAKING USE OF HIGH THROUGHPUT TECHNOLOGIES AS DESCRIBED BY CMS-2020-01-R: HCPCS

## 2021-08-16 PROCEDURE — 59409 OBSTETRICAL CARE: CPT

## 2021-08-16 PROCEDURE — 0HQ9XZZ REPAIR PERINEUM SKIN, EXTERNAL APPROACH: ICD-10-PCS | Performed by: OBSTETRICS & GYNECOLOGY

## 2021-08-16 PROCEDURE — 85025 COMPLETE CBC W/AUTO DIFF WBC: CPT

## 2021-08-16 PROCEDURE — 700101 HCHG RX REV CODE 250

## 2021-08-16 PROCEDURE — 700111 HCHG RX REV CODE 636 W/ 250 OVERRIDE (IP): Performed by: OBSTETRICS & GYNECOLOGY

## 2021-08-16 PROCEDURE — 10907ZC DRAINAGE OF AMNIOTIC FLUID, THERAPEUTIC FROM PRODUCTS OF CONCEPTION, VIA NATURAL OR ARTIFICIAL OPENING: ICD-10-PCS | Performed by: OBSTETRICS & GYNECOLOGY

## 2021-08-16 PROCEDURE — 700102 HCHG RX REV CODE 250 W/ 637 OVERRIDE(OP): Performed by: OBSTETRICS & GYNECOLOGY

## 2021-08-16 PROCEDURE — U0005 INFEC AGEN DETEC AMPLI PROBE: HCPCS

## 2021-08-16 PROCEDURE — 85027 COMPLETE CBC AUTOMATED: CPT

## 2021-08-16 PROCEDURE — A9270 NON-COVERED ITEM OR SERVICE: HCPCS | Performed by: OBSTETRICS & GYNECOLOGY

## 2021-08-16 PROCEDURE — 304965 HCHG RECOVERY SERVICES

## 2021-08-16 PROCEDURE — 770002 HCHG ROOM/CARE - OB PRIVATE (112)

## 2021-08-16 PROCEDURE — 36415 COLL VENOUS BLD VENIPUNCTURE: CPT

## 2021-08-16 PROCEDURE — 87426 SARSCOV CORONAVIRUS AG IA: CPT

## 2021-08-16 RX ORDER — SODIUM CHLORIDE, SODIUM LACTATE, POTASSIUM CHLORIDE, CALCIUM CHLORIDE 600; 310; 30; 20 MG/100ML; MG/100ML; MG/100ML; MG/100ML
INJECTION, SOLUTION INTRAVENOUS CONTINUOUS
Status: ACTIVE | OUTPATIENT
Start: 2021-08-16 | End: 2021-08-16

## 2021-08-16 RX ORDER — MISOPROSTOL 200 UG/1
600 TABLET ORAL
Status: DISCONTINUED | OUTPATIENT
Start: 2021-08-16 | End: 2021-08-17 | Stop reason: HOSPADM

## 2021-08-16 RX ORDER — METHYLERGONOVINE MALEATE 0.2 MG/ML
0.2 INJECTION INTRAVENOUS
Status: DISCONTINUED | OUTPATIENT
Start: 2021-08-16 | End: 2021-08-17 | Stop reason: HOSPADM

## 2021-08-16 RX ORDER — LIDOCAINE HYDROCHLORIDE 10 MG/ML
INJECTION, SOLUTION INFILTRATION; PERINEURAL
Status: COMPLETED
Start: 2021-08-16 | End: 2021-08-16

## 2021-08-16 RX ORDER — CARBOPROST TROMETHAMINE 250 UG/ML
250 INJECTION, SOLUTION INTRAMUSCULAR
Status: DISCONTINUED | OUTPATIENT
Start: 2021-08-16 | End: 2021-08-17 | Stop reason: HOSPADM

## 2021-08-16 RX ORDER — OXYCODONE HYDROCHLORIDE AND ACETAMINOPHEN 5; 325 MG/1; MG/1
1 TABLET ORAL EVERY 4 HOURS PRN
Status: DISCONTINUED | OUTPATIENT
Start: 2021-08-16 | End: 2021-08-17 | Stop reason: HOSPADM

## 2021-08-16 RX ORDER — MISOPROSTOL 200 UG/1
1000 TABLET ORAL
Status: DISCONTINUED | OUTPATIENT
Start: 2021-08-16 | End: 2021-08-16 | Stop reason: HOSPADM

## 2021-08-16 RX ORDER — SODIUM CHLORIDE, SODIUM LACTATE, POTASSIUM CHLORIDE, CALCIUM CHLORIDE 600; 310; 30; 20 MG/100ML; MG/100ML; MG/100ML; MG/100ML
INJECTION, SOLUTION INTRAVENOUS PRN
Status: DISCONTINUED | OUTPATIENT
Start: 2021-08-16 | End: 2021-08-17 | Stop reason: HOSPADM

## 2021-08-16 RX ORDER — DOCUSATE SODIUM 100 MG/1
100 CAPSULE, LIQUID FILLED ORAL 2 TIMES DAILY PRN
Status: DISCONTINUED | OUTPATIENT
Start: 2021-08-16 | End: 2021-08-17 | Stop reason: HOSPADM

## 2021-08-16 RX ORDER — IBUPROFEN 600 MG/1
600 TABLET ORAL EVERY 6 HOURS PRN
Status: DISCONTINUED | OUTPATIENT
Start: 2021-08-16 | End: 2021-08-17 | Stop reason: HOSPADM

## 2021-08-16 RX ORDER — OXYCODONE HYDROCHLORIDE AND ACETAMINOPHEN 5; 325 MG/1; MG/1
2 TABLET ORAL EVERY 4 HOURS PRN
Status: DISCONTINUED | OUTPATIENT
Start: 2021-08-16 | End: 2021-08-17 | Stop reason: HOSPADM

## 2021-08-16 RX ADMIN — LIDOCAINE HYDROCHLORIDE: 10 INJECTION, SOLUTION INFILTRATION; PERINEURAL at 06:00

## 2021-08-16 RX ADMIN — Medication 2000 ML/HR: at 05:53

## 2021-08-16 RX ADMIN — IBUPROFEN 600 MG: 600 TABLET, FILM COATED ORAL at 20:10

## 2021-08-16 RX ADMIN — IBUPROFEN 600 MG: 600 TABLET, FILM COATED ORAL at 13:08

## 2021-08-16 RX ADMIN — Medication 125 ML/HR: at 07:02

## 2021-08-16 RX ADMIN — IBUPROFEN 600 MG: 600 TABLET, FILM COATED ORAL at 07:00

## 2021-08-16 ASSESSMENT — PAIN DESCRIPTION - PAIN TYPE
TYPE: ACUTE PAIN

## 2021-08-16 ASSESSMENT — LIFESTYLE VARIABLES
TOTAL SCORE: 0
CONSUMPTION TOTAL: NEGATIVE
EVER_SMOKED: NEVER
EVER HAD A DRINK FIRST THING IN THE MORNING TO STEADY YOUR NERVES TO GET RID OF A HANGOVER: NO
DOES PATIENT WANT TO STOP DRINKING: NO
HOW MANY TIMES IN THE PAST YEAR HAVE YOU HAD 5 OR MORE DRINKS IN A DAY: 0
ALCOHOL_USE: NO
ON A TYPICAL DAY WHEN YOU DRINK ALCOHOL HOW MANY DRINKS DO YOU HAVE: 0
HAVE PEOPLE ANNOYED YOU BY CRITICIZING YOUR DRINKING: NO
TOTAL SCORE: 0
EVER FELT BAD OR GUILTY ABOUT YOUR DRINKING: NO
TOTAL SCORE: 0
HAVE YOU EVER FELT YOU SHOULD CUT DOWN ON YOUR DRINKING: NO
AVERAGE NUMBER OF DAYS PER WEEK YOU HAVE A DRINK CONTAINING ALCOHOL: 0

## 2021-08-16 ASSESSMENT — COPD QUESTIONNAIRES
DURING THE PAST 4 WEEKS HOW MUCH DID YOU FEEL SHORT OF BREATH: NONE/LITTLE OF THE TIME
HAVE YOU SMOKED AT LEAST 100 CIGARETTES IN YOUR ENTIRE LIFE: NO/DON'T KNOW
COPD SCREENING SCORE: 0
DO YOU EVER COUGH UP ANY MUCUS OR PHLEGM?: NO/ONLY WITH OCCASIONAL COLDS OR INFECTIONS
IN THE PAST 12 MONTHS DO YOU DO LESS THAN YOU USED TO BECAUSE OF YOUR BREATHING PROBLEMS: DISAGREE/UNSURE

## 2021-08-16 ASSESSMENT — PATIENT HEALTH QUESTIONNAIRE - PHQ9
1. LITTLE INTEREST OR PLEASURE IN DOING THINGS: NOT AT ALL
2. FEELING DOWN, DEPRESSED, IRRITABLE, OR HOPELESS: NOT AT ALL
SUM OF ALL RESPONSES TO PHQ9 QUESTIONS 1 AND 2: 0

## 2021-08-16 NOTE — PROGRESS NOTES
"Labor Progress Note    Jihan Chaparrita Henderson   39w2d      Subjective:  Pt hurting with contractions but declines epidural or pain meds at this time.   Uterine contractions:yes  Pain: Yes. Severity: moderate    Objective:   Vitals:    08/16/21 0052   BP: 118/85   Pulse: 71   Resp: 16   Temp: 36.1 °C (97 °F)   TempSrc: Temporal   Weight: 65.8 kg (145 lb)   Height: 1.676 m (5' 6\")     Fetal heart variability:140's category 1  [place]: q 2  SVE: 6-7/90/-1, arom, clear  Membranes ruptured: .yes, clear    Meds:   Epidural : .no  Pitocin: .no    Labs:  Recent Results (from the past 24 hour(s))   Hold Blood Bank Specimen (Not Tested)    Collection Time: 08/16/21  1:40 AM   Result Value Ref Range    Holding Tube - Bb DONE    CBC WITH DIFFERENTIAL    Collection Time: 08/16/21  1:40 AM   Result Value Ref Range    WBC 10.2 4.8 - 10.8 K/uL    RBC 4.09 (L) 4.20 - 5.40 M/uL    Hemoglobin 13.5 12.0 - 16.0 g/dL    Hematocrit 39.3 37.0 - 47.0 %    MCV 96.1 81.4 - 97.8 fL    MCH 33.0 27.0 - 33.0 pg    MCHC 34.4 33.6 - 35.0 g/dL    RDW 44.2 35.9 - 50.0 fL    Platelet Count 194 164 - 446 K/uL    MPV 11.1 9.0 - 12.9 fL    Neutrophils-Polys 62.80 44.00 - 72.00 %    Lymphocytes 27.30 22.00 - 41.00 %    Monocytes 8.00 0.00 - 13.40 %    Eosinophils 0.60 0.00 - 6.90 %    Basophils 0.40 0.00 - 1.80 %    Immature Granulocytes 0.90 0.00 - 0.90 %    Nucleated RBC 0.00 /100 WBC    Neutrophils (Absolute) 6.42 2.00 - 7.15 K/uL    Lymphs (Absolute) 2.79 1.00 - 4.80 K/uL    Monos (Absolute) 0.82 0.00 - 0.85 K/uL    Eos (Absolute) 0.06 0.00 - 0.51 K/uL    Baso (Absolute) 0.04 0.00 - 0.12 K/uL    Immature Granulocytes (abs) 0.09 0.00 - 0.11 K/uL    NRBC (Absolute) 0.00 K/uL   SARS-COV Antigen LORNE: Collect dry nasal swab    Collection Time: 08/16/21  1:40 AM   Result Value Ref Range    SARS-CoV-2 Source Nasal Swab     SARS-COV ANTIGEN LOR[place] NotDetected Not-Detected   SARS-CoV-2 PCR (24 hour In-House): Collect NP swab in VTM    Collection Time: 08/16/21  2:00 " AM    Specimen: Nasopharyngeal; Respirate   Result Value Ref Range    SARS-CoV-2 Source NP Swab        Assessment:   39w2d/active labor-pt progressing. Expt .  gbbs-neg  ama-neg nipt, msafp. Pt declined MFM referral.  Fetal status-reassuring        Corazon Sgeovia M.D.

## 2021-08-16 NOTE — CARE PLAN
Problem: Knowledge Deficit - L&D  Goal: Patient and family/caregivers will demonstrate understanding of plan of care, disease process/condition, diagnostic tests and medications  Outcome: Progressing     Problem: Psychosocial - L&D  Goal: Patient's level of anxiety will decrease  Outcome: Progressing     Problem: Risk for Injury  Goal: Patient and fetus will be free of preventable injury/complications  Outcome: Progressing     The patient is Stable - Low risk of patient condition declining or worsening

## 2021-08-16 NOTE — L&D DELIVERY NOTE
DATE OF SERVICE:  2021     ADMITTING DIAGNOSES:  1.  Intrauterine pregnancy at 39 weeks and 3 days.  2.  Active labor.  3.  GBS negative.  4.  Advanced maternal age.  The patient had negative NIPT, negative MSAFP.     PROCEDURES:  1.  Admission to labor and delivery.  2.  Normal spontaneous vaginal delivery of a vigorous female with Apgars of an   8 and 8.     DESCRIPTION OF PROCEDURE:  This patient is a 35-year-old  2, para 1   that was admitted at 39 weeks and 3 days in active labor.  When she arrived,   she was 5 cm dilated.  She was admitted.  She had artificial rupture of   membranes at 3:23 a.m., which was clear and she progressed through labor   without any Pitocin.  She declined an epidural and declined any pain   medications.  She started pushing.  She was prepped and draped in the usual   sterile fashion.  At 5:48 a.m., I assisted with a normal spontaneous vaginal   delivery of a vigorous female in RIAN position.  The head was delivered   atraumatically and the rest of the infant delivered without any problems.    Mouth and nose were bulb suctioned.  Infant placed on maternal abdomen.    Delayed cord clamping was performed for 4 minutes and then the cord was doubly   clamped and cut by the infant's father.  Cord gases were clamped and set   aside.  The placenta was then delivered intact at 6:04 a.m.  Three-vessel   cord.  The uterus was firm and hemostatic with an EBL of 300.  She did have a   first-degree midline laceration that was repaired with local lidocaine and   then 2-0 Vicryl on a CT1 needle without any problems.  The patient tolerated   the procedure well.  Lap and needle counts were correct x2.  This was, again,   a vigorous female with Apgars of 8 and 8.  Infant's weight is pending.  GBS   negative; therefore, no antibiotics were given.        ______________________________  MD HOLGER FOSTER/ALLIE    DD:  2021 06:27  DT:  2021 06:51    Job#:   [id number]    CC:MARIA ELENA LOTT MD

## 2021-08-16 NOTE — H&P
DATE OF ADMISSION:  2021     CHIEF COMPLAINT:  Contractions that worsened at 7:00 p.m.     HISTORY OF PRESENT ILLNESS:  This patient is a 35-year-old  2, para 1   with a due date of 2021 who is 39 weeks and 3 days based on last   menstrual cycle and ultrasound.  The patient's prenatal care has been with Dr. Puri. It has been uncomplicated.  She has advanced maternal age, declined   MFM referral.  She had a negative NIPT and negative MSAFP testing.  The   patient comes in tonight complaining of painful contractions that worsened   after 7:00 p.m.  When she arrived, the patient was dilated to a 5, 80, -2.    She was admitted. At this time, the patient declines an epidural or pain   medications and she was agreeable to artificial rupture of membranes.     PAST MEDICAL HISTORY:  History of anxiety and depression.     PAST SURGICAL HISTORY:  [place] teeth extraction in .     MEDICATIONS:  She is on:  1.  Prenatal vitamins.  2.  Ferrous sulfate 325 mg 1 p.o. daily.  3.  Vitamin C.  4.  Probiotic.     ALLERGIES:  No known drug allergies.     OBSTETRICAL HISTORY:  She is a  2, para 1.  On 2019, she had a   normal spontaneous vaginal delivery at 40 weeks and 6 days, 7 pounds 1 ounce   infant, uncomplicated labor and delivery.     GYNECOLOGIC HISTORY:  The patient started menstruating at age 11, has   menstrual cycles every 30 days, lasts about 6 days.  Her last menstrual cycle   was on 2020.  No history of STDs.  No history of HSV.     SOCIAL HISTORY:  The patient is .  Denies tobacco, alcohol or drug use.     PHYSICAL EXAMINATION:  VITAL SIGNS:  Blood pressure 118/85, heart rate is 71, afebrile.  GENERAL:  Pleasant female in mild distress secondary to contractions.  ABDOMEN:  Soft, gravid.  Leopold's to 7 pounds.  PELVIC:  Fetal heart tones at 140s, category 1.  [place] jan every 2   minutes.  Sterile vaginal exam 6-7 cm dilated, 90% effaced, -1 station.    Artificial  rupture of membranes performed and there was clear fluid.  EXTREMITIES:  No calf tenderness.     PRENATAL LABORATORY DATA:  GBS is negative.  MSAFP is negative.  NIPT is   negative.  Pap smear negative. GC and chlamydia negative.  She is O positive.   Antibody screen negative. HIV nonreactive. Hepatitis B surface antigen   negative. Rubella immune. Hepatitis C is negative.  RPR is nonreactive.     ASSESSMENT AND PLAN:  A 35-year-old  2, para 1 at 39 weeks and 2 days   by dates and ultrasound.  1.  Active labor.  The patient is changing her cervix.  She is now 6-7 cm   dilated.  She declines an epidural or pain medication.  She is status post   artificial rupture of membranes.  We will expect a normal spontaneous vaginal   delivery.  2.  GBS is negative.  3.  Advanced maternal age, negative NIPT, negative MSAFP.  4.  Fetal status reassuring.        ______________________________  MD HOLGER FOSTER/JEROD/CLINTON    DD:  2021 03:34  DT:  2021 04:08    Job#:  [id number]    CC:MARIA ELENA LOTT MD

## 2021-08-16 NOTE — L&D DELIVERY NOTE
Delivery note  , yodit, female with apgars 8 and 8. Placenta intact, 3 vc.  1st degree vaginal laceration repaired with local lidocaine and 2-0 vicryl ct-1 needle. Uterus is firm, ebl: 300. Mom and baby doing well.

## 2021-08-16 NOTE — PROGRESS NOTES
EDC -  EGA - 39-2    0045 - Pt arrived to labor and delivery for contractions. Pt placed in room LDA5.  0050 - External monitors in place X2. Category I FHT at this time. VSS. Pt reports good FM. No complaints of ROM or vaginal bleeding, but slight spotting. /-2. FOB at bedside. POC discussed with pt and family members, all questions answered.   0110 - Updated Dr Segovia on pt status. Admit orders received.   0120 - Pt tx to S217.   0323 - Dr Segovia at bedside. AROM, clear.   0500 - Pt requesting cervical exam. Pt C/+1. Pt to start pushing  0548 -  of viable baby girl. APGARs 8/8.   0604 - Placenta delivered spontaneously.   0700 - Report given. POC discussed.

## 2021-08-16 NOTE — PROGRESS NOTES
Pt up from L&D via wheelchair. Assessment complete. VSS. Fundus firm, lochia light. Pt oriented to room, call light, emergency light,bulb syringe and putting baby on back to sleep. Call light within reach. Will continue to monitor.

## 2021-08-16 NOTE — LACTATION NOTE
This note was copied from a baby's chart.  Baby  Weeks, , MOB + breast changes during pregnancy, no risk factors. MOB reports she  1st baby x 8 months without issue. MOB c/o sore nipples, mother has baby latched shallow independently. Discussed latching deeper, positioning baby at breast nipple to nose for deep latches to protect nipple damage. LC attempted to latch baby deeper, baby was toward end of feed, baby not interested in latching at this times.     Teaching reviewed on hunger cues, breastfeeding when baby shows cues, breastfeed a minimum 8 times in 24 hours no longer than 4 hours from last feed & cluster feeding is normal behavior.     Breastfeeding Support & Zoom sheets given for OP breastfeeding support.     Breastfeeding plan:  Breastfeed on cue a minimum 8x/24 hours no longer than 4 hours from last feed.

## 2021-08-16 NOTE — PROGRESS NOTES
"Labor Progress Note    Jihan Chaparrita Henderson   39w2d      Subjective:  Apt with urge to push.  Pt now pushing  Pt declined epidural or pain meds.Uterine contractions:yes  Pain: Yes. Severity: moderate    Objective:   Vitals:    08/16/21 0052 08/16/21 0320   BP: 118/85    Pulse: 71    Resp: 16    Temp: 36.1 °C (97 °F) 36.1 °C (96.9 °F)   TempSrc: Temporal Temporal   Weight: 65.8 kg (145 lb)    Height: 1.676 m (5' 6\")      Fetal heart variability: 140's category 1  [place]: q 2 min  SVE: c/c/+1    Membranes ruptured: .yes    Meds:   Epidural : .no  Pitocin: .no    Labs:  Recent Results (from the past 24 hour(s))   Hold Blood Bank Specimen (Not Tested)    Collection Time: 08/16/21  1:40 AM   Result Value Ref Range    Holding Tube - Bb DONE    CBC WITH DIFFERENTIAL    Collection Time: 08/16/21  1:40 AM   Result Value Ref Range    WBC 10.2 4.8 - 10.8 K/uL    RBC 4.09 (L) 4.20 - 5.40 M/uL    Hemoglobin 13.5 12.0 - 16.0 g/dL    Hematocrit 39.3 37.0 - 47.0 %    MCV 96.1 81.4 - 97.8 fL    MCH 33.0 27.0 - 33.0 pg    MCHC 34.4 33.6 - 35.0 g/dL    RDW 44.2 35.9 - 50.0 fL    Platelet Count 194 164 - 446 K/uL    MPV 11.1 9.0 - 12.9 fL    Neutrophils-Polys 62.80 44.00 - 72.00 %    Lymphocytes 27.30 22.00 - 41.00 %    Monocytes 8.00 0.00 - 13.40 %    Eosinophils 0.60 0.00 - 6.90 %    Basophils 0.40 0.00 - 1.80 %    Immature Granulocytes 0.90 0.00 - 0.90 %    Nucleated RBC 0.00 /100 WBC    Neutrophils (Absolute) 6.42 2.00 - 7.15 K/uL    Lymphs (Absolute) 2.79 1.00 - 4.80 K/uL    Monos (Absolute) 0.82 0.00 - 0.85 K/uL    Eos (Absolute) 0.06 0.00 - 0.51 K/uL    Baso (Absolute) 0.04 0.00 - 0.12 K/uL    Immature Granulocytes (abs) 0.09 0.00 - 0.11 K/uL    NRBC (Absolute) 0.00 K/uL   SARS-COV Antigen LORNE: Collect dry nasal swab    Collection Time: 08/16/21  1:40 AM   Result Value Ref Range    SARS-CoV-2 Source Nasal Swab     SARS-COV ANTIGEN LORNE NotDetected Not-Detected   SARS-CoV-2 PCR (24 hour In-House): Collect NP swab in VTM    " Collection Time: 21  2:00 AM    Specimen: Nasopharyngeal; Respirate   Result Value Ref Range    SARS-CoV-2 Source NP Swab        Assessment:   39w2d/complete-push, exp   gbbs negative        Corazon Segovia M.D.

## 2021-08-17 VITALS
SYSTOLIC BLOOD PRESSURE: 106 MMHG | OXYGEN SATURATION: 97 % | TEMPERATURE: 97.4 F | WEIGHT: 145 LBS | BODY MASS INDEX: 23.3 KG/M2 | HEIGHT: 66 IN | DIASTOLIC BLOOD PRESSURE: 77 MMHG | RESPIRATION RATE: 18 BRPM | HEART RATE: 81 BPM

## 2021-08-17 PROCEDURE — 700102 HCHG RX REV CODE 250 W/ 637 OVERRIDE(OP): Performed by: OBSTETRICS & GYNECOLOGY

## 2021-08-17 PROCEDURE — A9270 NON-COVERED ITEM OR SERVICE: HCPCS | Performed by: OBSTETRICS & GYNECOLOGY

## 2021-08-17 RX ADMIN — IBUPROFEN 600 MG: 600 TABLET, FILM COATED ORAL at 02:05

## 2021-08-17 ASSESSMENT — PAIN DESCRIPTION - PAIN TYPE
TYPE: ACUTE PAIN

## 2021-08-17 ASSESSMENT — EDINBURGH POSTNATAL DEPRESSION SCALE (EPDS)
THINGS HAVE BEEN GETTING ON TOP OF ME: NO, MOST OF THE TIME I HAVE COPED QUITE WELL
I HAVE BEEN SO UNHAPPY THAT I HAVE BEEN CRYING: NO, NEVER
I HAVE FELT SAD OR MISERABLE: NOT VERY OFTEN
I HAVE BEEN SO UNHAPPY THAT I HAVE HAD DIFFICULTY SLEEPING: NOT AT ALL
I HAVE FELT SCARED OR PANICKY FOR NO GOOD REASON: NO, NOT AT ALL
THE THOUGHT OF HARMING MYSELF HAS OCCURRED TO ME: NEVER
I HAVE BEEN ANXIOUS OR WORRIED FOR NO GOOD REASON: HARDLY EVER
I HAVE BLAMED MYSELF UNNECESSARILY WHEN THINGS WENT WRONG: YES, SOME OF THE TIME
I HAVE LOOKED FORWARD WITH ENJOYMENT TO THINGS: AS MUCH AS I EVER DID
I HAVE BEEN ABLE TO LAUGH AND SEE THE FUNNY SIDE OF THINGS: AS MUCH AS I ALWAYS COULD

## 2021-08-17 NOTE — DISCHARGE INSTRUCTIONS
PATIENT DISCHARGE EDUCATION INSTRUCTION SHEET  REASONS TO CALL YOUR OBSTETRICIAN  · Persistent fever, shaking, chills (Temperature higher than 100.4) may indicate you have an infection  · Heavy bleeding: soaking more than 1 pad per hour; Passing clots an egg-sized clot or bigger may mean you have an postpartum hemorrhage  · Foul odor from vagina or bad smelling or discolored discharge or blood  · Breast infection (Mastitis symptoms); breast pain, chills, fever, redness or red streaks, may feel flu like symptoms  · Urinary pain, burning or frequency  · Incision that is not healing, increased redness, swelling, tenderness or pain, or any pus from episiotomy or  site may mean you have an infection  · Redness, swelling, warmth, or painful to touch in the calf area of your leg may mean you have a blood clot  · Severe or intensified depression, thoughts or feelings of wanting to hurt yourself or someone else   · Pain in chest, obstructed breathing or shortness of breath (trouble catching your breath) may mean you are having a postpartum complication. Call your provider immediately   · Headache that does not get better, even after taking medicine, a bad headache with vision changes or pain in the upper right area of your belly may mean you have high blood pressure or post birth preeclampsia. Call your provider immediately    HAND WASHING  All family and friends should wash their hands:  · Before and after holding the baby  · Before feeding the baby  · After using the restroom or changing the baby's diaper    WOUND CARE  Ask your physician for additional care instructions. In general:  · Episiotomy/Laceration  · May use radha-spray bottle, witch hazel pads and dermaplast spray for comfort  · Use radha-spray bottle after urinating to cleanse perineal area  · To prevent burning during urination spray radha-water bottle on labial area   · Pat perineal area dry until episiotomy/laceration is healed  · Continue to use  radha-bottle until bleeding stops as needed  · If have a 2nd degree laceration or greater, a Sitz bath can offer relief from soreness, burning, and inflammation   · Sitz Bath   · Sit in 6 inches of warm water and soak laceration as needed until the laceration heals    VAGINAL CARE AND BLEEDING  · Nothing inside vagina for 6 weeks:   · No sexual intercourse, tampons or douching  · Bleeding may continue for 2-4 weeks. Amount and color may vary  · Soaking 1 pad or more in an hour for several hours is considered heavy bleeding  · Passing large egg sized blood clots can be concerning  · If you feel like you have heavy bleeding or are having increasing amount of blood clots call your Obstetrician immediately  · If you begin feeling faint upon standing, feeling sick to your stomach, have clammy skin, a really fast heartbeat, have chills, start feeling confused, dizzy, sleepy or weak, or feeling like you're going to faint call your Obstetrician immediately    HYPERTENSION   Preeclampsia or gestational hypertension are types of high blood pressure that only pregnant women can get. It is important for you to be aware of symptoms to seek early intervention and treatment. If you have any of these symptoms immediately call your Obstetrician    · Vision changes or blurred vision   · Severe headache or pain that is unrelieved with medication and will not go away  · Persistent pain in upper abdomen or shoulder   · Increased swelling of face, feet, or hands  · Difficulty breathing or shortness of breath at rest  · Urinating less than usual    URINATION AND BOWEL MOVEMENTS  · Eating more fiber (bran cereal, fruits, and vegetables) and drinking plenty of fluids will help to avoid constipation  · Urinary frequency and urgency after childbirth is normal  · If you experience any urinary pain, burning or frequency call your provider    BIRTH CONTROL  · It is possible to become pregnant at any time after delivery and while  "breastfeeding  · Plan to discuss a method of birth control with your physician at your post delivery follow up visit    POSTPARTUM BLUES  During the first few days after birth, you may experience a sense of the \"blues\" which may include impatience, irritability or even crying. These feelings come and go quickly. However, as many as 1 in 10 women experience emotional symptoms known as postpartum depression.     POSTPARTUM DEPRESSION    May start as early as the second or third day after delivery or take several weeks or months to develop. Symptoms of \"blues\" are present, but are more intense: Crying spells; loss of appetite; feelings of hopelessness or loss of control; fear of touching the baby; over concern or no concern at all about the baby; little or no concern about your own appearance/caring for yourself; and/or inability to sleep or excessive sleeping. Contact your Obstetrician if you are experiencing any of these symptoms     PREVENTING SHAKEN BABY  If you are angry or stressed, PUT THE BABY IN THE CRIB, step away, take some deep breaths, and wait until you are calm to care for the baby. DO NOT SHAKE THE BABY. You are not alone, call a supporter for help.  · [facility] 24/7 crisis call line (549-291-2590) or (1-674.762.8090)  · You can also text them, text \"ANSWER\" (040064)      "

## 2021-08-17 NOTE — LACTATION NOTE
This note was copied from a baby's chart.  Follow-up visit, baby 39.2 weeks, weight loss 2.98%, couplet to be discharged today. MOB is independently latching see latch assessment score. MOB reports baby has been breastfeeding well. MOB has Home [facility], personal pump paperwork provided.    Encouraged mother to follow-up with [facility] for OP breastfeeding support.     Breastfeeding plan:  Breastfeed on cue a minimum 8x/24 hours no longer than 4 hours from last feed.

## 2021-08-17 NOTE — PROGRESS NOTES
PP day #1    S:  Pt doing well.  Minimal lochia.  No problems voiding.  Breast feeding. Ready to go home.  OTC Motrin for pain    O: T 97.4 P 81  /77  ABD: Soft NT, FF below umb  EXT: No cords or Homans  H/H /  O+  GBS neg    A/P:  PP day  #1 S/P  at term - doing well  1.  D/C home  2.  F/U Dr. Puri 6 weeks  3.  Pelvic rest for 6 weeks  4.  OTC Motrin and PNV

## 2021-08-17 NOTE — PROGRESS NOTES
Pt assessment complete, medicated for pain per mar. poc and rounding discussed. Questions answered. FOB and infant at the bedside.

## 2021-09-27 ENCOUNTER — HOSPITAL ENCOUNTER (OUTPATIENT)
Dept: LAB | Facility: MEDICAL CENTER | Age: 36
End: 2021-09-27
Attending: OBSTETRICS & GYNECOLOGY
Payer: COMMERCIAL

## 2021-09-27 PROCEDURE — 88175 CYTOPATH C/V AUTO FLUID REDO: CPT

## 2021-09-28 LAB — CYTOLOGY REG CYTOL: NORMAL

## 2022-01-10 ENCOUNTER — HOSPITAL ENCOUNTER (OUTPATIENT)
Facility: MEDICAL CENTER | Age: 37
End: 2022-01-10
Attending: OTOLARYNGOLOGY | Admitting: OTOLARYNGOLOGY
Payer: COMMERCIAL

## 2022-01-12 ENCOUNTER — PRE-ADMISSION TESTING (OUTPATIENT)
Dept: ADMISSIONS | Facility: MEDICAL CENTER | Age: 37
End: 2022-01-12
Attending: OTOLARYNGOLOGY
Payer: COMMERCIAL

## 2022-01-12 VITALS — WEIGHT: 111.55 LBS | HEIGHT: 66 IN | BODY MASS INDEX: 17.93 KG/M2

## 2022-10-13 ENCOUNTER — NON-PROVIDER VISIT (OUTPATIENT)
Dept: OCCUPATIONAL MEDICINE | Facility: CLINIC | Age: 37
End: 2022-10-13

## 2022-10-13 DIAGNOSIS — Z02.1 PRE-EMPLOYMENT DRUG SCREENING: ICD-10-CM

## 2022-10-13 LAB
AMP AMPHETAMINE: NORMAL
COC COCAINE: NORMAL
INT CON NEG: NORMAL
INT CON POS: NORMAL
MET METHAMPHETAMINES: NORMAL
OPI OPIATES: NORMAL
PCP PHENCYCLIDINE: NORMAL
POC DRUG COMMENT 753798-OCCUPATIONAL HEALTH: NORMAL
THC: NORMAL

## 2022-10-13 PROCEDURE — 80305 DRUG TEST PRSMV DIR OPT OBS: CPT | Performed by: NURSE PRACTITIONER

## 2022-10-19 ENCOUNTER — HOSPITAL ENCOUNTER (OUTPATIENT)
Dept: LAB | Facility: MEDICAL CENTER | Age: 37
End: 2022-10-19
Attending: OBSTETRICS & GYNECOLOGY

## 2022-10-19 ENCOUNTER — HOSPITAL ENCOUNTER (OUTPATIENT)
Facility: MEDICAL CENTER | Age: 37
End: 2022-10-19
Attending: OBSTETRICS & GYNECOLOGY
Payer: COMMERCIAL

## 2022-10-19 PROCEDURE — 88175 CYTOPATH C/V AUTO FLUID REDO: CPT

## 2022-10-20 LAB — CYTOLOGY REG CYTOL: NORMAL

## 2025-04-30 ENCOUNTER — APPOINTMENT (OUTPATIENT)
Dept: URBAN - METROPOLITAN AREA CLINIC 6 | Facility: CLINIC | Age: 40
Setting detail: DERMATOLOGY
End: 2025-04-30

## 2025-04-30 DIAGNOSIS — L81.4 OTHER MELANIN HYPERPIGMENTATION: ICD-10-CM

## 2025-04-30 DIAGNOSIS — L81.1 CHLOASMA: ICD-10-CM

## 2025-04-30 DIAGNOSIS — Z71.89 OTHER SPECIFIED COUNSELING: ICD-10-CM

## 2025-04-30 DIAGNOSIS — D18.0 HEMANGIOMA: ICD-10-CM

## 2025-04-30 DIAGNOSIS — D22 MELANOCYTIC NEVI: ICD-10-CM

## 2025-04-30 DIAGNOSIS — L82.1 OTHER SEBORRHEIC KERATOSIS: ICD-10-CM

## 2025-04-30 PROBLEM — D23.5 OTHER BENIGN NEOPLASM OF SKIN OF TRUNK: Status: ACTIVE | Noted: 2025-04-30

## 2025-04-30 PROBLEM — D22.72 MELANOCYTIC NEVI OF LEFT LOWER LIMB, INCLUDING HIP: Status: ACTIVE | Noted: 2025-04-30

## 2025-04-30 PROBLEM — D23.61 OTHER BENIGN NEOPLASM OF SKIN OF RIGHT UPPER LIMB, INCLUDING SHOULDER: Status: ACTIVE | Noted: 2025-04-30

## 2025-04-30 PROBLEM — D22.62 MELANOCYTIC NEVI OF LEFT UPPER LIMB, INCLUDING SHOULDER: Status: ACTIVE | Noted: 2025-04-30

## 2025-04-30 PROBLEM — D22.61 MELANOCYTIC NEVI OF RIGHT UPPER LIMB, INCLUDING SHOULDER: Status: ACTIVE | Noted: 2025-04-30

## 2025-04-30 PROBLEM — D18.01 HEMANGIOMA OF SKIN AND SUBCUTANEOUS TISSUE: Status: ACTIVE | Noted: 2025-04-30

## 2025-04-30 PROBLEM — D22.71 MELANOCYTIC NEVI OF RIGHT LOWER LIMB, INCLUDING HIP: Status: ACTIVE | Noted: 2025-04-30

## 2025-04-30 PROBLEM — D22.5 MELANOCYTIC NEVI OF TRUNK: Status: ACTIVE | Noted: 2025-04-30

## 2025-04-30 PROCEDURE — ? PRESCRIPTION

## 2025-04-30 PROCEDURE — ? COUNSELING

## 2025-04-30 PROCEDURE — ? PHOTO-DOCUMENTATION

## 2025-04-30 PROCEDURE — 99213 OFFICE O/P EST LOW 20 MIN: CPT

## 2025-04-30 RX ORDER — H-QUINONE/TRETINOIN/HYDROCORT 6 %-0.025%
1 EMULSION (GRAM) TOPICAL QHS
Qty: 30 | Refills: 2 | Status: ERX | COMMUNITY
Start: 2025-04-30

## 2025-04-30 RX ADMIN — Medication 1: at 00:00

## 2025-04-30 ASSESSMENT — LOCATION SIMPLE DESCRIPTION DERM
LOCATION SIMPLE: RIGHT UPPER ARM
LOCATION SIMPLE: CHEST
LOCATION SIMPLE: LEFT FOREARM
LOCATION SIMPLE: RIGHT FOREARM
LOCATION SIMPLE: LEFT PRETIBIAL REGION
LOCATION SIMPLE: RIGHT THIGH
LOCATION SIMPLE: LEFT UPPER ARM
LOCATION SIMPLE: RIGHT PRETIBIAL REGION
LOCATION SIMPLE: RIGHT KNEE
LOCATION SIMPLE: LEFT THIGH
LOCATION SIMPLE: LEFT KNEE
LOCATION SIMPLE: ABDOMEN

## 2025-04-30 ASSESSMENT — LOCATION DETAILED DESCRIPTION DERM
LOCATION DETAILED: EPIGASTRIC SKIN
LOCATION DETAILED: RIGHT ANTERIOR DISTAL THIGH
LOCATION DETAILED: LOWER STERNUM
LOCATION DETAILED: RIGHT ANTERIOR DISTAL UPPER ARM
LOCATION DETAILED: LEFT ANTERIOR DISTAL UPPER ARM
LOCATION DETAILED: LEFT KNEE
LOCATION DETAILED: LEFT VENTRAL PROXIMAL FOREARM
LOCATION DETAILED: LEFT ANTERIOR PROXIMAL UPPER ARM
LOCATION DETAILED: RIGHT ANTECUBITAL SKIN
LOCATION DETAILED: RIGHT PROXIMAL PRETIBIAL REGION
LOCATION DETAILED: RIGHT ANTERIOR PROXIMAL UPPER ARM
LOCATION DETAILED: RIGHT VENTRAL PROXIMAL FOREARM
LOCATION DETAILED: LEFT PROXIMAL PRETIBIAL REGION
LOCATION DETAILED: PERIUMBILICAL SKIN
LOCATION DETAILED: RIGHT KNEE
LOCATION DETAILED: LEFT ANTERIOR DISTAL THIGH

## 2025-04-30 ASSESSMENT — LOCATION ZONE DERM
LOCATION ZONE: ARM
LOCATION ZONE: LEG
LOCATION ZONE: TRUNK

## 2025-05-14 ENCOUNTER — APPOINTMENT (OUTPATIENT)
Dept: URBAN - METROPOLITAN AREA CLINIC 20 | Facility: CLINIC | Age: 40
Setting detail: DERMATOLOGY
End: 2025-05-14

## 2025-05-14 DIAGNOSIS — Z41.9 ENCOUNTER FOR PROCEDURE FOR PURPOSES OTHER THAN REMEDYING HEALTH STATE, UNSPECIFIED: ICD-10-CM

## 2025-05-14 PROCEDURE — ? ADDITIONAL NOTES

## 2025-05-14 ASSESSMENT — LOCATION DETAILED DESCRIPTION DERM
LOCATION DETAILED: RIGHT INFERIOR CENTRAL MALAR CHEEK
LOCATION DETAILED: LEFT INFERIOR MEDIAL MALAR CHEEK

## 2025-05-14 ASSESSMENT — LOCATION ZONE DERM: LOCATION ZONE: FACE

## 2025-05-14 ASSESSMENT — LOCATION SIMPLE DESCRIPTION DERM
LOCATION SIMPLE: RIGHT CHEEK
LOCATION SIMPLE: LEFT CHEEK

## 2025-05-14 NOTE — PROCEDURE: ADDITIONAL NOTES
Render Risk Assessment In Note?: no
Detail Level: Simple
Additional Notes: Patient had a series of Fraxels two years ago. Pt is on hydroquinone. We still recommend Fraxels going forward. Another series of three.

## 2025-05-28 ENCOUNTER — APPOINTMENT (OUTPATIENT)
Dept: URBAN - METROPOLITAN AREA CLINIC 20 | Facility: CLINIC | Age: 40
Setting detail: DERMATOLOGY
End: 2025-05-28

## 2025-05-28 DIAGNOSIS — Z41.9 ENCOUNTER FOR PROCEDURE FOR PURPOSES OTHER THAN REMEDYING HEALTH STATE, UNSPECIFIED: ICD-10-CM

## 2025-05-28 PROCEDURE — ? FRAXEL

## 2025-05-28 ASSESSMENT — LOCATION SIMPLE DESCRIPTION DERM
LOCATION SIMPLE: RIGHT LIP
LOCATION SIMPLE: RIGHT CHEEK
LOCATION SIMPLE: LEFT CHEEK
LOCATION SIMPLE: GLABELLA
LOCATION SIMPLE: INFERIOR FOREHEAD

## 2025-05-28 ASSESSMENT — LOCATION DETAILED DESCRIPTION DERM
LOCATION DETAILED: RIGHT LOWER CUTANEOUS LIP
LOCATION DETAILED: INFERIOR MID FOREHEAD
LOCATION DETAILED: RIGHT CENTRAL MALAR CHEEK
LOCATION DETAILED: GLABELLA
LOCATION DETAILED: LEFT CENTRAL MALAR CHEEK

## 2025-05-28 ASSESSMENT — LOCATION ZONE DERM
LOCATION ZONE: LIP
LOCATION ZONE: FACE

## 2025-07-30 ENCOUNTER — APPOINTMENT (OUTPATIENT)
Dept: URBAN - METROPOLITAN AREA CLINIC 6 | Facility: CLINIC | Age: 40
Setting detail: DERMATOLOGY
End: 2025-07-30

## 2025-07-30 DIAGNOSIS — L81.1 CHLOASMA: ICD-10-CM | Status: IMPROVED

## 2025-07-30 DIAGNOSIS — L70.8 OTHER ACNE: ICD-10-CM

## 2025-07-30 PROCEDURE — ? TREATMENT REGIMEN

## 2025-07-30 PROCEDURE — ? PRESCRIPTION

## 2025-07-30 PROCEDURE — ? COUNSELING

## 2025-07-30 PROCEDURE — ? DIAGNOSIS COMMENT

## 2025-07-30 PROCEDURE — ? PHOTO-DOCUMENTATION

## 2025-07-30 RX ORDER — TRETIONIN 0.25 MG/G
1 CREAM TOPICAL QHS
Qty: 45 | Refills: 3 | Status: ERX | COMMUNITY
Start: 2025-07-30

## 2025-07-30 RX ORDER — SPIRONOLACTONE 50 MG/1
1 TABLET, FILM COATED ORAL BID
Qty: 180 | Refills: 1 | Status: ERX | COMMUNITY
Start: 2025-07-30

## 2025-07-30 RX ORDER — AZELAIC ACID 0.15 G/G
1 GEL TOPICAL QHS
Qty: 50 | Refills: 3 | Status: ERX

## 2025-07-30 RX ADMIN — SPIRONOLACTONE 1: 50 TABLET, FILM COATED ORAL at 00:00

## 2025-07-30 RX ADMIN — TRETIONIN 1: 0.25 CREAM TOPICAL at 00:00

## 2025-07-30 ASSESSMENT — LOCATION ZONE DERM: LOCATION ZONE: FACE

## 2025-07-30 ASSESSMENT — LOCATION DETAILED DESCRIPTION DERM: LOCATION DETAILED: LEFT CHIN

## 2025-07-30 ASSESSMENT — LOCATION SIMPLE DESCRIPTION DERM: LOCATION SIMPLE: CHIN

## 2025-08-07 ENCOUNTER — RX ONLY (RX ONLY)
Age: 40
End: 2025-08-07

## 2025-08-07 RX ORDER — AZELAIC ACID 0.15 G/G
1 GEL TOPICAL QHS
Qty: 50 | Refills: 3 | Status: ERX